# Patient Record
Sex: FEMALE | Race: WHITE | Employment: OTHER | URBAN - METROPOLITAN AREA
[De-identification: names, ages, dates, MRNs, and addresses within clinical notes are randomized per-mention and may not be internally consistent; named-entity substitution may affect disease eponyms.]

---

## 2017-04-20 ENCOUNTER — HOSPITAL ENCOUNTER (OUTPATIENT)
Dept: GENERAL RADIOLOGY | Age: 67
Discharge: HOME | End: 2017-04-20
Payer: MEDICARE

## 2017-04-20 ENCOUNTER — HOSPITAL ENCOUNTER (OUTPATIENT)
Dept: MRI IMAGING | Age: 67
Discharge: HOME | End: 2017-04-20
Payer: MEDICARE

## 2017-04-20 DIAGNOSIS — M54.12 CERVICAL RADICULOPATHY: ICD-10-CM

## 2017-04-20 DIAGNOSIS — M54.12 RADICULOPATHY OF CERVICAL SPINE: ICD-10-CM

## 2017-04-20 PROCEDURE — 72141 MRI NECK SPINE W/O DYE: CPT

## 2017-04-20 PROCEDURE — 72050 X-RAY EXAM NECK SPINE 4/5VWS: CPT

## 2017-11-01 ENCOUNTER — HOSPITAL ENCOUNTER (OUTPATIENT)
Dept: MAMMOGRAPHY | Age: 67
Discharge: HOME | End: 2017-11-01
Attending: FAMILY MEDICINE
Payer: MEDICARE

## 2017-11-01 DIAGNOSIS — Z12.31 VISIT FOR SCREENING MAMMOGRAM: ICD-10-CM

## 2017-11-01 PROCEDURE — 77067 SCR MAMMO BI INCL CAD: CPT

## 2018-05-14 ENCOUNTER — HOSPITAL ENCOUNTER (OUTPATIENT)
Dept: CT IMAGING | Age: 68
Discharge: HOME | End: 2018-05-14
Attending: SURGERY
Payer: MEDICARE

## 2018-05-14 DIAGNOSIS — R22.1 NECK MASS: ICD-10-CM

## 2018-05-14 PROCEDURE — 70490 CT SOFT TISSUE NECK W/O DYE: CPT

## 2018-11-07 ENCOUNTER — HOSPITAL ENCOUNTER (OUTPATIENT)
Dept: MAMMOGRAPHY | Age: 68
Discharge: HOME | End: 2018-11-07
Payer: MEDICARE

## 2018-11-07 DIAGNOSIS — Z12.39 SCREENING BREAST EXAMINATION: ICD-10-CM

## 2018-11-07 PROCEDURE — 77067 SCR MAMMO BI INCL CAD: CPT

## 2019-09-20 ENCOUNTER — HOSPITAL ENCOUNTER (OUTPATIENT)
Dept: INTERVENTIONAL RADIOLOGY/VASCULAR | Age: 69
Discharge: HOME | End: 2019-09-20
Attending: ORTHOPAEDIC SURGERY | Admitting: RADIOLOGY
Payer: MEDICARE

## 2019-09-20 DIAGNOSIS — M54.50 LOW BACK PAIN: ICD-10-CM

## 2019-09-20 DIAGNOSIS — M51.36 DDD (DEGENERATIVE DISC DISEASE), LUMBAR: ICD-10-CM

## 2019-09-20 PROCEDURE — 74011250636 HC RX REV CODE- 250/636: Performed by: RADIOLOGY

## 2019-09-20 PROCEDURE — 74011636320 HC RX REV CODE- 636/320: Performed by: RADIOLOGY

## 2019-09-20 PROCEDURE — 74011000250 HC RX REV CODE- 250: Performed by: RADIOLOGY

## 2019-09-20 PROCEDURE — 64483 NJX AA&/STRD TFRM EPI L/S 1: CPT

## 2019-09-20 RX ORDER — LIDOCAINE HYDROCHLORIDE 10 MG/ML
10 INJECTION, SOLUTION EPIDURAL; INFILTRATION; INTRACAUDAL; PERINEURAL ONCE
Status: COMPLETED | OUTPATIENT
Start: 2019-09-20 | End: 2019-09-20

## 2019-09-20 RX ORDER — DEXAMETHASONE SODIUM PHOSPHATE 10 MG/ML
10 INJECTION INTRAMUSCULAR; INTRAVENOUS ONCE
Status: COMPLETED | OUTPATIENT
Start: 2019-09-20 | End: 2019-09-20

## 2019-09-20 RX ADMIN — DEXAMETHASONE SODIUM PHOSPHATE 10 MG: 10 INJECTION INTRAMUSCULAR; INTRAVENOUS at 13:56

## 2019-09-20 RX ADMIN — IOHEXOL 2 ML: 180 INJECTION INTRAVENOUS at 14:13

## 2019-09-20 RX ADMIN — LIDOCAINE HYDROCHLORIDE 10 ML: 10 INJECTION, SOLUTION EPIDURAL; INFILTRATION; INTRACAUDAL; PERINEURAL at 14:12

## 2019-11-12 ENCOUNTER — HOSPITAL ENCOUNTER (OUTPATIENT)
Dept: MAMMOGRAPHY | Age: 69
Discharge: HOME | End: 2019-11-12
Attending: FAMILY MEDICINE
Payer: MEDICARE

## 2019-11-12 DIAGNOSIS — Z12.31 VISIT FOR SCREENING MAMMOGRAM: ICD-10-CM

## 2019-11-12 PROCEDURE — 77067 SCR MAMMO BI INCL CAD: CPT

## 2020-10-22 ENCOUNTER — TRANSCRIBE ORDER (OUTPATIENT)
Dept: SCHEDULING | Age: 70
End: 2020-10-22

## 2020-10-22 DIAGNOSIS — Z12.31 VISIT FOR SCREENING MAMMOGRAM: Primary | ICD-10-CM

## 2020-10-28 ENCOUNTER — HOSPITAL ENCOUNTER (OUTPATIENT)
Dept: PREADMISSION TESTING | Age: 70
Discharge: HOME | End: 2020-10-28
Payer: MEDICARE

## 2020-10-28 VITALS
WEIGHT: 192.24 LBS | HEART RATE: 75 BPM | SYSTOLIC BLOOD PRESSURE: 130 MMHG | TEMPERATURE: 97.9 F | HEIGHT: 65 IN | DIASTOLIC BLOOD PRESSURE: 75 MMHG | BODY MASS INDEX: 32.03 KG/M2 | RESPIRATION RATE: 18 BRPM

## 2020-10-28 LAB
ABO + RH BLD: NORMAL
APPEARANCE UR: CLEAR
BACTERIA URNS QL MICRO: NEGATIVE /HPF
BILIRUB UR QL: NEGATIVE
BLOOD GROUP ANTIBODIES SERPL: NORMAL
COLOR UR: NORMAL
EPITH CASTS URNS QL MICRO: NORMAL /LPF
GLUCOSE UR STRIP.AUTO-MCNC: NEGATIVE MG/DL
HGB UR QL STRIP: NEGATIVE
KETONES UR QL STRIP.AUTO: NEGATIVE MG/DL
LEUKOCYTE ESTERASE UR QL STRIP.AUTO: NEGATIVE
NITRITE UR QL STRIP.AUTO: NEGATIVE
PH UR STRIP: 6.5 [PH] (ref 5–8)
PROT UR STRIP-MCNC: NEGATIVE MG/DL
RBC #/AREA URNS HPF: NORMAL /HPF (ref 0–5)
SP GR UR REFRACTOMETRY: 1.01 (ref 1–1.03)
SPECIMEN EXP DATE BLD: NORMAL
UA: UC IF INDICATED,UAUC: NORMAL
UROBILINOGEN UR QL STRIP.AUTO: 0.2 EU/DL (ref 0.2–1)
WBC URNS QL MICRO: NORMAL /HPF (ref 0–4)

## 2020-10-28 PROCEDURE — 86900 BLOOD TYPING SEROLOGIC ABO: CPT

## 2020-10-28 PROCEDURE — 81001 URINALYSIS AUTO W/SCOPE: CPT

## 2020-10-28 RX ORDER — ALENDRONATE SODIUM 70 MG/1
70 TABLET ORAL
COMMUNITY

## 2020-10-28 RX ORDER — ERGOCALCIFEROL 1.25 MG/1
50000 CAPSULE ORAL
COMMUNITY

## 2020-10-28 RX ORDER — OMEPRAZOLE 40 MG/1
40 CAPSULE, DELAYED RELEASE ORAL DAILY
COMMUNITY

## 2020-10-28 RX ORDER — ATORVASTATIN CALCIUM 10 MG/1
10 TABLET, FILM COATED ORAL DAILY
COMMUNITY

## 2020-10-28 RX ORDER — VALSARTAN 80 MG/1
80 TABLET ORAL DAILY
COMMUNITY

## 2020-10-28 NOTE — PERIOP NOTES
PAT Nurse Practitioner   Pre-Operative Chart Review/Assessment:-ORTHOPEDIC/NEUROSURGICAL SPINE                Patient Name:  Sabina Demarco                                                           Age:   65 y.o.    :  1950     Today's Date:  2020     Date of PAT:   10/28/2020      Date of Surgery:    11/10/2020      Procedure(s):  L4-S1 Laminectomy REVISION and Fusion     Surgeon:   Dr. Kel Souza                       PLAN:       1)  PCP: Dr. Sonja Estrada        2)  Cardiac Clearance: Followed by Dr. Angelena Snellen. LOV 9/3/20. Stable, no changes. 3)  Diabetic Treatment Consult: Not indicated. A1c-5.6. 4)  Sleep Apnea evaluation:  + EDGAR dx. Uses CPAP.        5)  Treatment for MRSA/Staph Aureus: Neg       6)  Additional Concerns: HTN, GERD              Vital Signs:         Vitals:    10/28/20 1133   BP: 130/75   Pulse: 75   Resp: 18   Temp: 97.9 °F (36.6 °C)   Weight: 87.2 kg (192 lb 3.9 oz)   Height: 5' 5\" (1.651 m)            ____________________________________________  PAST MEDICAL HISTORY  Past Medical History:   Diagnosis Date    Arrhythmia     HX MITRAL VALVE PROLAPSE, HAS BEEN SINCE DIAGNOSED NEGATIVE     Arthritis     Autoimmune disease ([address]     HLA B27    Chronic pain     LEGS, AND WHOLE BODY     GERD (gastroesophageal reflux disease)     High cholesterol     Hypertension     Sleep apnea       ____________________________________________  PAST SURGICAL HISTORY  Past Surgical History:   Procedure Laterality Date    HX CATARACT REMOVAL Bilateral 2016    HX  SECTION  1982    X1    HX HEART CATHETERIZATION      HX HEENT  2010    ORAL SURGERY     HX HERNIA REPAIR      HX HYSTERECTOMY      HX LUMBAR FUSION      HX TONSILLECTOMY      HX WISDOM TEETH EXTRACTION      IR INJ FORAMIN EPID LUMB ANES/STER SNGL  2019      ____________________________________________  HOME MEDICATIONS    Current Outpatient Medications   Medication Sig    omeprazole (PRILOSEC) 40 mg capsule Take 40 mg by mouth daily.  valsartan (DIOVAN) 80 mg tablet Take 80 mg by mouth daily.  atorvastatin (LIPITOR) 10 mg tablet Take 10 mg by mouth daily.  ergocalciferol (Vitamin D2) 1,250 mcg (50,000 unit) capsule Take 50,000 Units by mouth every seven (7) days.  ASA/mag hydrox/aluminum hydrox (ARTHRITIS PAIN FORMULA PO) Take 650 mg by mouth.  alendronate (FOSAMAX) 70 mg tablet Take 70 mg by mouth every seven (7) days.  flecainide (TAMBOCOR) 150 mg tablet Take 0.5 Tabs by mouth every twelve (12) hours. No current facility-administered medications for this encounter.       ____________________________________________  ALLERGIES  Allergies   Allergen Reactions    Penicillins Anaphylaxis    Codeine Itching     ANXIOUS AND NERVOUS      Sulfa (Sulfonamide Antibiotics) Hives      ____________________________________________  SOCIAL HISTORY  Social History     Tobacco Use    Smoking status: Former Smoker     Last attempt to quit: 10/28/1967     Years since quittin.0    Smokeless tobacco: Never Used    Tobacco comment: 1 PACK A WEEK    Substance Use Topics    Alcohol use: No      ____________________________________________        Labs:     CBC, BMP, PT, Hgb A1c, U/A and EKG on chart.     Hospital Outpatient Visit on 10/28/2020   Component Date Value Ref Range Status    Crossmatch Expiration 10/28/2020 2020,2359   Final    ABO/Rh(D) 10/28/2020 B POSITIVE   Final    Antibody screen 10/28/2020 NEG   Final    Color 10/28/2020 YELLOW/STRAW    Final    Color Reference Range: Straw, Yellow or Dark Yellow    Appearance 10/28/2020 CLEAR  CLEAR   Final    Specific gravity 10/28/2020 1.006  1.003 - 1.030   Final    pH (UA) 10/28/2020 6.5  5.0 - 8.0   Final    Protein 10/28/2020 Negative  NEG mg/dL Final    Glucose 10/28/2020 Negative  NEG mg/dL Final    Ketone 10/28/2020 Negative  NEG mg/dL Final    Bilirubin 10/28/2020 Negative  NEG   Final    Blood 10/28/2020 Negative  NEG   Final    Urobilinogen 10/28/2020 0.2  0.2 - 1.0 EU/dL Final    Nitrites 10/28/2020 Negative  NEG   Final    Leukocyte Esterase 10/28/2020 Negative  NEG   Final    WBC 10/28/2020 0-4  0 - 4 /hpf Final    RBC 10/28/2020 0-5  0 - 5 /hpf Final    Epithelial cells 10/28/2020 FEW  FEW /lpf Final    Epithelial cell category consists of squamous cells and /or transitional urothelial cells. Renal tubular cells, if present, are separately identified as such.  Bacteria 10/28/2020 Negative  NEG /hpf Final    UA:UC IF INDICATED 10/28/2020 CULTURE NOT INDICATED BY UA RESULT  CNI   Final    Special Requests: 10/28/2020 NO SPECIAL REQUESTS    Final    Culture result: 10/28/2020 MRSA NOT PRESENT    Final       Skin:     Denies open wounds, cuts, sores, rashes or other areas of concern in PAT assessment.         Aaron Cardenas, NP

## 2020-10-28 NOTE — PERIOP NOTES
PRE-OPERATIVE INSTRUCTIONS REVIEWED WITH PATIENT. TIME GIVEN TO ASK QUESTIONS   PATIENT GIVEN 2-BOTTLE OF CHG SOAP. REVIEWED   PATIENT GIVEN SSI INFECTION FAQ SHEET.   MRSA / MSSA TREATMENT INSTRUCTION SHEET      INSTRUCTIONS GIVEN AND REVIEWED ON NEW ADMISSION STATUS AND COVID TESTING     CALLED CARDIO FOR NOTES AND PCP

## 2020-10-29 LAB
BACTERIA SPEC CULT: NORMAL
BACTERIA SPEC CULT: NORMAL
SERVICE CMNT-IMP: NORMAL

## 2020-11-03 NOTE — H&P
[facility]  Location: [place] [address] [address]  Patient #: [id number]  : 1950   / Language: English / Race: White  Female    History of Present Illness   The patient is a 65year old female who presents with a complaint of Low Back Pain. Note for \"Low Back Pain\": She comes in today for repeat follow-up for ongoing lower back and bilateral leg symptoms. Jan Gannon has been getting epidurals last year without significant improvement.  She has back pain with standing.  It is worse with ambulation.  It is better with sitting.  The epidurals only provided very temporary relief last year. Jan Gannon denies any bowel bladder difficulties. Problem List/Past Medical   SPONDYLOSIS (721.3)    Back pain (724.5  M54.9)    Primary osteoarthritis of both knees (715.16  M17.0)    DDD (722.4)    DDD (degenerative disc disease), cervical (722.4  M50.30)    Lumbar adjacent segment disease with spondylolisthesis (722.52, 738.4  M51.36, M43.16)    Peripheral neuropathy (356.9  G62. 9)    SPONDYLOLISTHESIS, ACQ. (738.4)    CERVICALGIA (723.1)    Hypertension, goal below 140/90 (401.9  I10)    REVIEW OF SYSTEMS: Systems were reviewed by the provider.    Low back pain (724.2  M54.5)    Cervicalgia (723.1  M54.2)    Degenerative lumbar spinal stenosis (724.02  M48.061)    Cervical kyphosis (737.10  M40.202)    History of lumbar fusion (V45.4  Z98.1)    Lumbar radiculopathy, right (724.4  M54.16)      Allergies   Penicillins   [2020]: Hives. Sulfa Drugs   [2020]: Hives. No Known Drug Allergies   [2020]:    Family History  Arthritis    Alcohol Abuse    Colon Cancer    Migraine Headache      Social History   Caffeine use   2014: Drinks coffee and tea 3-4 times a day. Tobacco use   Current every day smoker: 0.25 packs per day, started smoking in  at age 19 (11.5 pack years), smokes 0 cigar(s) per week, uses 0 can(s) of smokeless tobacco per week.   Seat Belt Use   2014: always  Tiajuana Shone Exposure   2014: frequently    Medication History   predniSONE  (5MG Tablet, Oral) Active. Losartan Potassium  (100MG Tablet, Oral) Active. Omeprazole  (Oral as directed) Specific strength unknown - Active. Famotidine  (Oral as directed) Specific strength unknown - Active. Flecainide Acetate  (50MG Tablet, Oral four times daily, as needed) Active. Alendronate Sodium  (70MG Tablet, Oral once a week) Active. Medications Reconciled     Past Surgical History  Tonsillectomy    Tubal Ligation    Hysterectomy   non-cancerous: partial and vaginal   Delivery   1 time  Spinal Surgery    Spinal Fusion   lower back  Abdominal Hernia Surgery    Inguinal Hernia Repair   open: right  Joint Fusion      Diagnostic Studies History  Lumbar Spine X-ray   Date: 2020, Results: X-rays today demonstrate a prior L4-5 fusion. She has a approximately 13 mm grade 1 spondylolisthesis at L5-S1. It reduces slightly with extension. Other Problems   Anemia    Osteoporosis    Gastroesophageal Reflux Disease    Rheumatoid Arthritis    Transfusion history    Severe allergy    Arthritis    Treatment options were discussed with the patient in full.    Numbness and tingling lower extremities (782.0)      Vitals  2020 4:13 PM  Weight: 190 lb   Height: 65 in   Body Surface Area: 1.94 m²   Body Mass Index: 31.62 kg/m²      Physical Exam   Neurologic  Sensory  Light Touch - Intact - Globally. Overall Assessment of Muscle Strength and Tone reveals  Lower Extremities - Right Iliopsoas - 5/5. Left Iliopsoas - 5/5. Right Tibialis Anterior - 5/5. Left Tibialis Anterior - 5/5. Right Gastroc-Soleus - 5/5. Left Gastroc-Soleus - 5/5. Right EHL - 4-/5. Left EHL - 5/5. General Assessment of Reflexes  Right Ankle - Clonus is not present. Left Ankle - Clonus is not present. Reflexes (Dermatomes)  2/2 Normal - Left Achilles (L5-S2), Left Knee (L2-4), Right Achilles (L5-S2) and Right Knee (L2-4).     Musculoskeletal  Global Assessment  Examination of related systems reveals - well-developed, well-nourished, in no acute distress, alert and oriented x 3. Gait and Station - normal gait and station and normal posture. Right Lower Extremity - normal strength and tone, normal range of motion without pain and no instability, subluxation or laxity. Left Lower Extremity - normal strength and tone, normal range of motion without pain and no instability, subluxation or laxity. Spine/Ribs/Pelvis  Cervical Spine - Examination of the cervical spine reveals - no tenderness to palpation, no pain, no swelling, edema or erythema, normal cervical spine movements and normal sensation. Thoracic (Dorsal) Spine - Examination of the thoracic spine reveals - no tenderness over thoracic vertebrae, no pain, normal sensation and normal thoracic spine movements. Lumbosacral Spine - Examination of the lumbosacral spine reveals - no known fractures or deformities. Inspection and Palpation - Tenderness - moderate. Assessment of pain reveals the following findings - The pain is characterized as - moderate. Location - pain refers to lower back bilaterally. ROJM - Trunk Extension - 15 degrees. Lumbar Spine Flexion - 35 °. Lumbosacral Spine - Functional Testing - Babinski Test negative, Prone Knee Bending Test negative, Slump Test negative, Straight Leg Raising Test negative. Assessment & Plan   REVIEW OF SYSTEMS: Systems were reviewed by the provider.(V49.9)    Current Plans  Pt Education - How to [facility] using Patient Portal and 3rd Party Apps: discussed with patient and provided information. X-RAY EXAM OF LUMBAR SPINE, 4 OR MORE VIEWS (69764) (Standing AP, Lateral, Flexion and Extension views were taken today using Digital Radiography.)  Restarted traMADol HCl 50 MG Oral Tablet, 1 (one) Tablet every 6-8 hours as needed for pain, #30, 09/10/2020, No Refill.     History of lumbar fusion (V45.4  Z98.1)    Spondylolisthesis (Acquired) (738.4 M43.10)  Impression: The patient has a chronic street of lower back pain and bilateral leg pain. She has a breakdown below her previous L4-5 fusion with unstable spondylosis. She is an excellent candidate for revision laminectomy with extension of her fusion to S1. Risk and benefits were discussed with her. The risks and benefits were discussed at length with the patient and the patient has elected to proceed. Indications for surgery include failed conservative treatment. Alternative treatments, risks and the perioperative course were discussed with the patient. All questions were answered. The risks and benefits of the procedure were explained. Benefits include definitive diagnosis, relief of pain, elimination of deformity and improved function. Risks of surgery including bleeding, infection, weakness, numbness, CSF leak, failure to improve symptoms, exacerbation of medical co-morbidities and even death were discussed with the patient. Lumbar stenosis with neurogenic claudication (724.03  M48.062)    Treatment options were discussed with the patient in full.(V65.49)    Current Plans  Presurgical planning was preformed with the patient today  Surgery to be scheduled  Procedure: L4-S1 revision laminectomy and L4-S1 fusion    Signed by Estelita Maravilla MD     Date of Surgery Update:  Wili Moctezuma was seen and examined. History and physical has been reviewed. The patient has been examined.  There have been no significant clinical changes since the completion of the originally dated History and Physical.    Signed By: Estelita Maravilla MD     November 10, 2020 11:41 AM

## 2020-11-06 ENCOUNTER — TRANSCRIBE ORDER (OUTPATIENT)
Dept: REGISTRATION | Age: 70
End: 2020-11-06

## 2020-11-06 ENCOUNTER — HOSPITAL ENCOUNTER (OUTPATIENT)
Dept: PREADMISSION TESTING | Age: 70
Discharge: HOME | End: 2020-11-06
Payer: MEDICARE

## 2020-11-06 DIAGNOSIS — Z01.812 PRE-PROCEDURE LAB EXAM: Primary | ICD-10-CM

## 2020-11-06 DIAGNOSIS — Z01.812 PRE-PROCEDURE LAB EXAM: ICD-10-CM

## 2020-11-06 PROCEDURE — 87635 SARS-COV-2 COVID-19 AMP PRB: CPT

## 2020-11-07 LAB — SARS-COV-2, COV2NT: NOT DETECTED

## 2020-11-10 ENCOUNTER — HOSPITAL ENCOUNTER (INPATIENT)
Age: 70
LOS: 4 days | Discharge: HOME HEALTH | DRG: 455 | End: 2020-11-14
Attending: ORTHOPAEDIC SURGERY | Admitting: ORTHOPAEDIC SURGERY
Payer: MEDICARE

## 2020-11-10 ENCOUNTER — APPOINTMENT (OUTPATIENT)
Dept: GENERAL RADIOLOGY | Age: 70
DRG: 455 | End: 2020-11-10
Attending: ORTHOPAEDIC SURGERY
Payer: MEDICARE

## 2020-11-10 ENCOUNTER — ANESTHESIA (OUTPATIENT)
Dept: SURGERY | Age: 70
DRG: 455 | End: 2020-11-10
Payer: MEDICARE

## 2020-11-10 ENCOUNTER — ANESTHESIA EVENT (OUTPATIENT)
Dept: SURGERY | Age: 70
DRG: 455 | End: 2020-11-10
Payer: MEDICARE

## 2020-11-10 DIAGNOSIS — M48.061 SPINAL STENOSIS OF LUMBAR REGION WITHOUT NEUROGENIC CLAUDICATION: Primary | ICD-10-CM

## 2020-11-10 LAB
GLUCOSE BLD STRIP.AUTO-MCNC: 90 MG/DL (ref 65–100)
SERVICE CMNT-IMP: NORMAL

## 2020-11-10 PROCEDURE — 74011000250 HC RX REV CODE- 250: Performed by: ORTHOPAEDIC SURGERY

## 2020-11-10 PROCEDURE — 82962 GLUCOSE BLOOD TEST: CPT

## 2020-11-10 PROCEDURE — 01NB0ZZ RELEASE LUMBAR NERVE, OPEN APPROACH: ICD-10-PCS | Performed by: ORTHOPAEDIC SURGERY

## 2020-11-10 PROCEDURE — 77030004391 HC BUR FLUT MEDT -C: Performed by: ORTHOPAEDIC SURGERY

## 2020-11-10 PROCEDURE — 74011000258 HC RX REV CODE- 258: Performed by: NURSE ANESTHETIST, CERTIFIED REGISTERED

## 2020-11-10 PROCEDURE — 74011000250 HC RX REV CODE- 250: Performed by: NURSE ANESTHETIST, CERTIFIED REGISTERED

## 2020-11-10 PROCEDURE — 77030037714 HC CLOSR DEV INCIS ZIP STRY -C: Performed by: ORTHOPAEDIC SURGERY

## 2020-11-10 PROCEDURE — 74011250636 HC RX REV CODE- 250/636: Performed by: ANESTHESIOLOGY

## 2020-11-10 PROCEDURE — 77030019908 HC STETH ESOPH SIMS -A: Performed by: ANESTHESIOLOGY

## 2020-11-10 PROCEDURE — 76060000037 HC ANESTHESIA 3 TO 3.5 HR: Performed by: ORTHOPAEDIC SURGERY

## 2020-11-10 PROCEDURE — 77030040361 HC SLV COMPR DVT MDII -B: Performed by: ORTHOPAEDIC SURGERY

## 2020-11-10 PROCEDURE — 77030005513 HC CATH URETH FOL11 MDII -B: Performed by: ORTHOPAEDIC SURGERY

## 2020-11-10 PROCEDURE — 74011000250 HC RX REV CODE- 250: Performed by: PHYSICIAN ASSISTANT

## 2020-11-10 PROCEDURE — 0SG1071 FUSION OF 2 OR MORE LUMBAR VERTEBRAL JOINTS WITH AUTOLOGOUS TISSUE SUBSTITUTE, POSTERIOR APPROACH, POSTERIOR COLUMN, OPEN APPROACH: ICD-10-PCS | Performed by: ORTHOPAEDIC SURGERY

## 2020-11-10 PROCEDURE — 77030031139 HC SUT VCRL2 J&J -A: Performed by: ORTHOPAEDIC SURGERY

## 2020-11-10 PROCEDURE — 74011250637 HC RX REV CODE- 250/637: Performed by: PHYSICIAN ASSISTANT

## 2020-11-10 PROCEDURE — 77030041394 HC GRFT BN PROT GRWTH FCTR BSYC -I1: Performed by: ORTHOPAEDIC SURGERY

## 2020-11-10 PROCEDURE — 74011250636 HC RX REV CODE- 250/636: Performed by: NURSE ANESTHETIST, CERTIFIED REGISTERED

## 2020-11-10 PROCEDURE — 77030026438 HC STYL ET INTUB CARD -A: Performed by: ANESTHESIOLOGY

## 2020-11-10 PROCEDURE — 77030038600 HC TU BPLR IRR DISP STRY -B: Performed by: ORTHOPAEDIC SURGERY

## 2020-11-10 PROCEDURE — C1713 ANCHOR/SCREW BN/BN,TIS/BN: HCPCS | Performed by: ORTHOPAEDIC SURGERY

## 2020-11-10 PROCEDURE — 2709999900 HC NON-CHARGEABLE SUPPLY: Performed by: ORTHOPAEDIC SURGERY

## 2020-11-10 PROCEDURE — 74011250636 HC RX REV CODE- 250/636

## 2020-11-10 PROCEDURE — 74011250636 HC RX REV CODE- 250/636: Performed by: PHYSICIAN ASSISTANT

## 2020-11-10 PROCEDURE — 76010000172 HC OR TIME 2.5 TO 3 HR INTENSV-TIER 1: Performed by: ORTHOPAEDIC SURGERY

## 2020-11-10 PROCEDURE — 77030038552 HC DRN WND MDII -A: Performed by: ORTHOPAEDIC SURGERY

## 2020-11-10 PROCEDURE — 77030008684 HC TU ET CUF COVD -B: Performed by: ANESTHESIOLOGY

## 2020-11-10 PROCEDURE — 0SG30AJ FUSION OF LUMBOSACRAL JOINT WITH INTERBODY FUSION DEVICE, POSTERIOR APPROACH, ANTERIOR COLUMN, OPEN APPROACH: ICD-10-PCS | Performed by: ORTHOPAEDIC SURGERY

## 2020-11-10 PROCEDURE — 74011250636 HC RX REV CODE- 250/636: Performed by: ORTHOPAEDIC SURGERY

## 2020-11-10 PROCEDURE — 77030029099 HC BN WAX SSPC -A: Performed by: ORTHOPAEDIC SURGERY

## 2020-11-10 PROCEDURE — 77030013079 HC BLNKT BAIR HGGR 3M -A: Performed by: ANESTHESIOLOGY

## 2020-11-10 PROCEDURE — 0ST40ZZ RESECTION OF LUMBOSACRAL DISC, OPEN APPROACH: ICD-10-PCS | Performed by: ORTHOPAEDIC SURGERY

## 2020-11-10 PROCEDURE — C1889 IMPLANT/INSERT DEVICE, NOC: HCPCS | Performed by: ORTHOPAEDIC SURGERY

## 2020-11-10 PROCEDURE — 65270000032 HC RM SEMIPRIVATE

## 2020-11-10 PROCEDURE — 72100 X-RAY EXAM L-S SPINE 2/3 VWS: CPT

## 2020-11-10 PROCEDURE — 76210000016 HC OR PH I REC 1 TO 1.5 HR: Performed by: ORTHOPAEDIC SURGERY

## 2020-11-10 PROCEDURE — 74011250637 HC RX REV CODE- 250/637: Performed by: ANESTHESIOLOGY

## 2020-11-10 PROCEDURE — 2709999900 HC NON-CHARGEABLE SUPPLY

## 2020-11-10 PROCEDURE — 77030014650 HC SEAL MTRX FLOSEL BAXT -C: Performed by: ORTHOPAEDIC SURGERY

## 2020-11-10 PROCEDURE — 01NR0ZZ RELEASE SACRAL NERVE, OPEN APPROACH: ICD-10-PCS | Performed by: ORTHOPAEDIC SURGERY

## 2020-11-10 PROCEDURE — 0QP004Z REMOVAL OF INTERNAL FIXATION DEVICE FROM LUMBAR VERTEBRA, OPEN APPROACH: ICD-10-PCS | Performed by: ORTHOPAEDIC SURGERY

## 2020-11-10 PROCEDURE — 77030018836 HC SOL IRR NACL ICUM -A: Performed by: ORTHOPAEDIC SURGERY

## 2020-11-10 PROCEDURE — 77030037808 HC GRFT SPNG OSTEOAMP BTUS -H1: Performed by: ORTHOPAEDIC SURGERY

## 2020-11-10 DEVICE — SCREW 55840007540 5.5/6 MAS 7.5X40 CC
Type: IMPLANTABLE DEVICE | Site: SPINE LUMBAR | Status: FUNCTIONAL
Brand: CD HORIZON® SPINAL SYSTEM

## 2020-11-10 DEVICE — GRAFT BNE SUB M W20XH7XL30MM COMPRESSIBLE SPNG STRP PROVIDE: Type: IMPLANTABLE DEVICE | Site: SPINE LUMBAR | Status: FUNCTIONAL

## 2020-11-10 DEVICE — STRIP 7800310 MASTERGRAFT STRIP 10CM
Type: IMPLANTABLE DEVICE | Site: SPINE LUMBAR | Status: FUNCTIONAL
Brand: MASTERGRAFT® STRIP

## 2020-11-10 DEVICE — SPACER 7770723 ELEVATE X-LOR 23X7MM
Type: IMPLANTABLE DEVICE | Site: SPINE LUMBAR | Status: FUNCTIONAL
Brand: ELEVATE™ SPINAL SYSTEM

## 2020-11-10 DEVICE — GRAFT BNE XL: Type: IMPLANTABLE DEVICE | Site: SPINE LUMBAR | Status: FUNCTIONAL

## 2020-11-10 RX ORDER — FENTANYL CITRATE 50 UG/ML
25 INJECTION, SOLUTION INTRAMUSCULAR; INTRAVENOUS
Status: DISCONTINUED | OUTPATIENT
Start: 2020-11-10 | End: 2020-11-10 | Stop reason: HOSPADM

## 2020-11-10 RX ORDER — ONDANSETRON 2 MG/ML
4 INJECTION INTRAMUSCULAR; INTRAVENOUS
Status: ACTIVE | OUTPATIENT
Start: 2020-11-10 | End: 2020-11-11

## 2020-11-10 RX ORDER — SUCCINYLCHOLINE CHLORIDE 20 MG/ML
INJECTION INTRAMUSCULAR; INTRAVENOUS AS NEEDED
Status: DISCONTINUED | OUTPATIENT
Start: 2020-11-10 | End: 2020-11-10 | Stop reason: HOSPADM

## 2020-11-10 RX ORDER — VANCOMYCIN/0.9 % SOD CHLORIDE 1.5G/250ML
1500 PLASTIC BAG, INJECTION (ML) INTRAVENOUS ONCE
Status: COMPLETED | OUTPATIENT
Start: 2020-11-11 | End: 2020-11-11

## 2020-11-10 RX ORDER — SODIUM CHLORIDE 9 MG/ML
25 INJECTION, SOLUTION INTRAVENOUS CONTINUOUS
Status: DISCONTINUED | OUTPATIENT
Start: 2020-11-10 | End: 2020-11-10 | Stop reason: HOSPADM

## 2020-11-10 RX ORDER — SODIUM CHLORIDE 0.9 % (FLUSH) 0.9 %
5-40 SYRINGE (ML) INJECTION AS NEEDED
Status: DISCONTINUED | OUTPATIENT
Start: 2020-11-10 | End: 2020-11-10 | Stop reason: HOSPADM

## 2020-11-10 RX ORDER — SODIUM CHLORIDE, SODIUM LACTATE, POTASSIUM CHLORIDE, CALCIUM CHLORIDE 600; 310; 30; 20 MG/100ML; MG/100ML; MG/100ML; MG/100ML
75 INJECTION, SOLUTION INTRAVENOUS CONTINUOUS
Status: DISCONTINUED | OUTPATIENT
Start: 2020-11-10 | End: 2020-11-10 | Stop reason: HOSPADM

## 2020-11-10 RX ORDER — PROPOFOL 10 MG/ML
INJECTION, EMULSION INTRAVENOUS
Status: DISCONTINUED | OUTPATIENT
Start: 2020-11-10 | End: 2020-11-10 | Stop reason: HOSPADM

## 2020-11-10 RX ORDER — SODIUM CHLORIDE 0.9 % (FLUSH) 0.9 %
5-40 SYRINGE (ML) INJECTION EVERY 8 HOURS
Status: DISCONTINUED | OUTPATIENT
Start: 2020-11-10 | End: 2020-11-10 | Stop reason: HOSPADM

## 2020-11-10 RX ORDER — HYDROMORPHONE HYDROCHLORIDE 1 MG/ML
0.2 INJECTION, SOLUTION INTRAMUSCULAR; INTRAVENOUS; SUBCUTANEOUS
Status: DISCONTINUED | OUTPATIENT
Start: 2020-11-10 | End: 2020-11-10 | Stop reason: HOSPADM

## 2020-11-10 RX ORDER — KETAMINE HYDROCHLORIDE 10 MG/ML
INJECTION, SOLUTION INTRAMUSCULAR; INTRAVENOUS AS NEEDED
Status: DISCONTINUED | OUTPATIENT
Start: 2020-11-10 | End: 2020-11-10 | Stop reason: HOSPADM

## 2020-11-10 RX ORDER — ACETAMINOPHEN 325 MG/1
650 TABLET ORAL ONCE
Status: COMPLETED | OUTPATIENT
Start: 2020-11-10 | End: 2020-11-10

## 2020-11-10 RX ORDER — ROPIVACAINE HYDROCHLORIDE 5 MG/ML
30 INJECTION, SOLUTION EPIDURAL; INFILTRATION; PERINEURAL ONCE
Status: DISCONTINUED | OUTPATIENT
Start: 2020-11-10 | End: 2020-11-10 | Stop reason: HOSPADM

## 2020-11-10 RX ORDER — SODIUM CHLORIDE 0.9 % (FLUSH) 0.9 %
5-40 SYRINGE (ML) INJECTION AS NEEDED
Status: DISCONTINUED | OUTPATIENT
Start: 2020-11-10 | End: 2020-11-14 | Stop reason: HOSPADM

## 2020-11-10 RX ORDER — LIDOCAINE HYDROCHLORIDE 10 MG/ML
0.1 INJECTION, SOLUTION EPIDURAL; INFILTRATION; INTRACAUDAL; PERINEURAL AS NEEDED
Status: DISCONTINUED | OUTPATIENT
Start: 2020-11-10 | End: 2020-11-10 | Stop reason: HOSPADM

## 2020-11-10 RX ORDER — DIPHENHYDRAMINE HYDROCHLORIDE 50 MG/ML
12.5 INJECTION, SOLUTION INTRAMUSCULAR; INTRAVENOUS
Status: ACTIVE | OUTPATIENT
Start: 2020-11-10 | End: 2020-11-11

## 2020-11-10 RX ORDER — ONDANSETRON 2 MG/ML
4 INJECTION INTRAMUSCULAR; INTRAVENOUS AS NEEDED
Status: DISCONTINUED | OUTPATIENT
Start: 2020-11-10 | End: 2020-11-10 | Stop reason: HOSPADM

## 2020-11-10 RX ORDER — FENTANYL CITRATE 50 UG/ML
50 INJECTION, SOLUTION INTRAMUSCULAR; INTRAVENOUS AS NEEDED
Status: DISCONTINUED | OUTPATIENT
Start: 2020-11-10 | End: 2020-11-10 | Stop reason: HOSPADM

## 2020-11-10 RX ORDER — VANCOMYCIN HYDROCHLORIDE 1 G/20ML
INJECTION, POWDER, LYOPHILIZED, FOR SOLUTION INTRAVENOUS AS NEEDED
Status: DISCONTINUED | OUTPATIENT
Start: 2020-11-10 | End: 2020-11-10 | Stop reason: HOSPADM

## 2020-11-10 RX ORDER — PROPOFOL 10 MG/ML
INJECTION, EMULSION INTRAVENOUS AS NEEDED
Status: DISCONTINUED | OUTPATIENT
Start: 2020-11-10 | End: 2020-11-10 | Stop reason: HOSPADM

## 2020-11-10 RX ORDER — MIDAZOLAM HYDROCHLORIDE 1 MG/ML
1 INJECTION, SOLUTION INTRAMUSCULAR; INTRAVENOUS AS NEEDED
Status: DISCONTINUED | OUTPATIENT
Start: 2020-11-10 | End: 2020-11-10 | Stop reason: HOSPADM

## 2020-11-10 RX ORDER — MIDAZOLAM HYDROCHLORIDE 1 MG/ML
0.5 INJECTION, SOLUTION INTRAMUSCULAR; INTRAVENOUS
Status: DISCONTINUED | OUTPATIENT
Start: 2020-11-10 | End: 2020-11-10 | Stop reason: HOSPADM

## 2020-11-10 RX ORDER — ROCURONIUM BROMIDE 10 MG/ML
INJECTION, SOLUTION INTRAVENOUS AS NEEDED
Status: DISCONTINUED | OUTPATIENT
Start: 2020-11-10 | End: 2020-11-10 | Stop reason: HOSPADM

## 2020-11-10 RX ORDER — MORPHINE SULFATE IN 0.9 % NACL 150MG/30ML
PATIENT CONTROLLED ANALGESIA SYRINGE INTRAVENOUS
Status: COMPLETED
Start: 2020-11-10 | End: 2020-11-10

## 2020-11-10 RX ORDER — EPHEDRINE SULFATE/0.9% NACL/PF 50 MG/5 ML
SYRINGE (ML) INTRAVENOUS AS NEEDED
Status: DISCONTINUED | OUTPATIENT
Start: 2020-11-10 | End: 2020-11-10 | Stop reason: HOSPADM

## 2020-11-10 RX ORDER — FACIAL-BODY WIPES
10 EACH TOPICAL DAILY PRN
Status: DISCONTINUED | OUTPATIENT
Start: 2020-11-12 | End: 2020-11-14 | Stop reason: HOSPADM

## 2020-11-10 RX ORDER — KETOROLAC TROMETHAMINE 30 MG/ML
15 INJECTION, SOLUTION INTRAMUSCULAR; INTRAVENOUS EVERY 6 HOURS
Status: COMPLETED | OUTPATIENT
Start: 2020-11-10 | End: 2020-11-11

## 2020-11-10 RX ORDER — ACETAMINOPHEN 500 MG
1000 TABLET ORAL EVERY 6 HOURS
Status: DISCONTINUED | OUTPATIENT
Start: 2020-11-10 | End: 2020-11-11

## 2020-11-10 RX ORDER — POLYETHYLENE GLYCOL 3350 17 G/17G
17 POWDER, FOR SOLUTION ORAL DAILY
Status: DISCONTINUED | OUTPATIENT
Start: 2020-11-11 | End: 2020-11-14 | Stop reason: HOSPADM

## 2020-11-10 RX ORDER — MORPHINE SULFATE 10 MG/ML
2 INJECTION, SOLUTION INTRAMUSCULAR; INTRAVENOUS
Status: DISCONTINUED | OUTPATIENT
Start: 2020-11-10 | End: 2020-11-10 | Stop reason: HOSPADM

## 2020-11-10 RX ORDER — DEXAMETHASONE SODIUM PHOSPHATE 4 MG/ML
INJECTION, SOLUTION INTRA-ARTICULAR; INTRALESIONAL; INTRAMUSCULAR; INTRAVENOUS; SOFT TISSUE AS NEEDED
Status: DISCONTINUED | OUTPATIENT
Start: 2020-11-10 | End: 2020-11-10 | Stop reason: HOSPADM

## 2020-11-10 RX ORDER — ONDANSETRON 2 MG/ML
INJECTION INTRAMUSCULAR; INTRAVENOUS AS NEEDED
Status: DISCONTINUED | OUTPATIENT
Start: 2020-11-10 | End: 2020-11-10 | Stop reason: HOSPADM

## 2020-11-10 RX ORDER — VANCOMYCIN/0.9 % SOD CHLORIDE 1.5G/250ML
1500 PLASTIC BAG, INJECTION (ML) INTRAVENOUS ONCE
Status: COMPLETED | OUTPATIENT
Start: 2020-11-10 | End: 2020-11-10

## 2020-11-10 RX ORDER — OXYCODONE HYDROCHLORIDE 5 MG/1
10 TABLET ORAL
Status: DISCONTINUED | OUTPATIENT
Start: 2020-11-10 | End: 2020-11-13

## 2020-11-10 RX ORDER — GLYCOPYRROLATE 0.2 MG/ML
INJECTION INTRAMUSCULAR; INTRAVENOUS AS NEEDED
Status: DISCONTINUED | OUTPATIENT
Start: 2020-11-10 | End: 2020-11-10 | Stop reason: HOSPADM

## 2020-11-10 RX ORDER — MORPHINE SULFATE 2 MG/ML
2 INJECTION, SOLUTION INTRAMUSCULAR; INTRAVENOUS
Status: ACTIVE | OUTPATIENT
Start: 2020-11-10 | End: 2020-11-11

## 2020-11-10 RX ORDER — SODIUM CHLORIDE 9 MG/ML
125 INJECTION, SOLUTION INTRAVENOUS CONTINUOUS
Status: DISCONTINUED | OUTPATIENT
Start: 2020-11-10 | End: 2020-11-11

## 2020-11-10 RX ORDER — MORPHINE SULFATE IN 0.9 % NACL 150MG/30ML
PATIENT CONTROLLED ANALGESIA SYRINGE INTRAVENOUS
Status: DISCONTINUED | OUTPATIENT
Start: 2020-11-10 | End: 2020-11-11

## 2020-11-10 RX ORDER — ATORVASTATIN CALCIUM 10 MG/1
10 TABLET, FILM COATED ORAL DAILY
Status: DISCONTINUED | OUTPATIENT
Start: 2020-11-11 | End: 2020-11-11

## 2020-11-10 RX ORDER — NEOSTIGMINE METHYLSULFATE 1 MG/ML
INJECTION, SOLUTION INTRAVENOUS AS NEEDED
Status: DISCONTINUED | OUTPATIENT
Start: 2020-11-10 | End: 2020-11-10 | Stop reason: HOSPADM

## 2020-11-10 RX ORDER — NALOXONE HYDROCHLORIDE 0.4 MG/ML
0.4 INJECTION, SOLUTION INTRAMUSCULAR; INTRAVENOUS; SUBCUTANEOUS AS NEEDED
Status: DISCONTINUED | OUTPATIENT
Start: 2020-11-10 | End: 2020-11-14 | Stop reason: HOSPADM

## 2020-11-10 RX ORDER — HYDROMORPHONE HYDROCHLORIDE 2 MG/ML
INJECTION, SOLUTION INTRAMUSCULAR; INTRAVENOUS; SUBCUTANEOUS AS NEEDED
Status: DISCONTINUED | OUTPATIENT
Start: 2020-11-10 | End: 2020-11-10 | Stop reason: HOSPADM

## 2020-11-10 RX ORDER — AMOXICILLIN 250 MG
1 CAPSULE ORAL 2 TIMES DAILY
Status: DISCONTINUED | OUTPATIENT
Start: 2020-11-10 | End: 2020-11-14 | Stop reason: HOSPADM

## 2020-11-10 RX ORDER — LOSARTAN POTASSIUM 50 MG/1
50 TABLET ORAL DAILY
Status: DISCONTINUED | OUTPATIENT
Start: 2020-11-11 | End: 2020-11-11

## 2020-11-10 RX ORDER — MIDAZOLAM HYDROCHLORIDE 1 MG/ML
INJECTION, SOLUTION INTRAMUSCULAR; INTRAVENOUS AS NEEDED
Status: DISCONTINUED | OUTPATIENT
Start: 2020-11-10 | End: 2020-11-10 | Stop reason: HOSPADM

## 2020-11-10 RX ORDER — LIDOCAINE HYDROCHLORIDE 20 MG/ML
INJECTION, SOLUTION EPIDURAL; INFILTRATION; INTRACAUDAL; PERINEURAL AS NEEDED
Status: DISCONTINUED | OUTPATIENT
Start: 2020-11-10 | End: 2020-11-10 | Stop reason: HOSPADM

## 2020-11-10 RX ORDER — PANTOPRAZOLE SODIUM 40 MG/1
40 TABLET, DELAYED RELEASE ORAL
Status: DISCONTINUED | OUTPATIENT
Start: 2020-11-11 | End: 2020-11-11

## 2020-11-10 RX ORDER — ERGOCALCIFEROL 1.25 MG/1
50000 CAPSULE ORAL
Status: DISCONTINUED | OUTPATIENT
Start: 2020-11-10 | End: 2020-11-10

## 2020-11-10 RX ORDER — SODIUM CHLORIDE 0.9 % (FLUSH) 0.9 %
5-40 SYRINGE (ML) INJECTION EVERY 8 HOURS
Status: DISCONTINUED | OUTPATIENT
Start: 2020-11-10 | End: 2020-11-14 | Stop reason: HOSPADM

## 2020-11-10 RX ORDER — SODIUM CHLORIDE, SODIUM LACTATE, POTASSIUM CHLORIDE, CALCIUM CHLORIDE 600; 310; 30; 20 MG/100ML; MG/100ML; MG/100ML; MG/100ML
125 INJECTION, SOLUTION INTRAVENOUS CONTINUOUS
Status: DISCONTINUED | OUTPATIENT
Start: 2020-11-10 | End: 2020-11-10 | Stop reason: HOSPADM

## 2020-11-10 RX ORDER — OXYCODONE HYDROCHLORIDE 5 MG/1
5 TABLET ORAL
Status: DISCONTINUED | OUTPATIENT
Start: 2020-11-10 | End: 2020-11-13

## 2020-11-10 RX ORDER — FENTANYL CITRATE 50 UG/ML
INJECTION, SOLUTION INTRAMUSCULAR; INTRAVENOUS AS NEEDED
Status: DISCONTINUED | OUTPATIENT
Start: 2020-11-10 | End: 2020-11-10 | Stop reason: HOSPADM

## 2020-11-10 RX ORDER — DIPHENHYDRAMINE HYDROCHLORIDE 50 MG/ML
12.5 INJECTION, SOLUTION INTRAMUSCULAR; INTRAVENOUS AS NEEDED
Status: DISCONTINUED | OUTPATIENT
Start: 2020-11-10 | End: 2020-11-10 | Stop reason: HOSPADM

## 2020-11-10 RX ADMIN — Medication 10 ML: at 22:00

## 2020-11-10 RX ADMIN — VANCOMYCIN HYDROCHLORIDE 1500 MG: 10 INJECTION, POWDER, LYOPHILIZED, FOR SOLUTION INTRAVENOUS at 12:29

## 2020-11-10 RX ADMIN — SODIUM CHLORIDE 125 ML/HR: 900 INJECTION, SOLUTION INTRAVENOUS at 17:27

## 2020-11-10 RX ADMIN — Medication: at 17:22

## 2020-11-10 RX ADMIN — DEXAMETHASONE SODIUM PHOSPHATE 4 MG: 4 INJECTION, SOLUTION INTRAMUSCULAR; INTRAVENOUS at 16:18

## 2020-11-10 RX ADMIN — SUCCINYLCHOLINE CHLORIDE 100 MG: 20 INJECTION, SOLUTION INTRAMUSCULAR; INTRAVENOUS at 14:20

## 2020-11-10 RX ADMIN — FENTANYL CITRATE 50 MCG: 50 INJECTION, SOLUTION INTRAMUSCULAR; INTRAVENOUS at 14:20

## 2020-11-10 RX ADMIN — PROPOFOL 50 MG: 10 INJECTION, EMULSION INTRAVENOUS at 14:33

## 2020-11-10 RX ADMIN — BENZOCAINE AND MENTHOL 1 LOZENGE: 15; 3.6 LOZENGE ORAL at 21:32

## 2020-11-10 RX ADMIN — HYDROMORPHONE HYDROCHLORIDE 0.5 MG: 2 INJECTION, SOLUTION INTRAMUSCULAR; INTRAVENOUS; SUBCUTANEOUS at 15:56

## 2020-11-10 RX ADMIN — ROCURONIUM BROMIDE 25 MG: 10 SOLUTION INTRAVENOUS at 14:41

## 2020-11-10 RX ADMIN — Medication 5 MG: at 15:38

## 2020-11-10 RX ADMIN — ONDANSETRON HYDROCHLORIDE 4 MG: 2 INJECTION, SOLUTION INTRAMUSCULAR; INTRAVENOUS at 16:29

## 2020-11-10 RX ADMIN — Medication 15 MG: at 14:44

## 2020-11-10 RX ADMIN — FENTANYL CITRATE 25 MCG: 50 INJECTION, SOLUTION INTRAMUSCULAR; INTRAVENOUS at 18:00

## 2020-11-10 RX ADMIN — ROCURONIUM BROMIDE 5 MG: 10 SOLUTION INTRAVENOUS at 14:20

## 2020-11-10 RX ADMIN — PROPOFOL 100 MCG/KG/MIN: 10 INJECTION, EMULSION INTRAVENOUS at 14:37

## 2020-11-10 RX ADMIN — Medication 1 MG: at 16:27

## 2020-11-10 RX ADMIN — PROPOFOL 50 MG: 10 INJECTION, EMULSION INTRAVENOUS at 14:21

## 2020-11-10 RX ADMIN — MIDAZOLAM 2 MG: 1 INJECTION INTRAMUSCULAR; INTRAVENOUS at 14:10

## 2020-11-10 RX ADMIN — ACETAMINOPHEN 1000 MG: 500 TABLET ORAL at 21:34

## 2020-11-10 RX ADMIN — DEXMEDETOMIDINE HYDROCHLORIDE 8 MCG: 100 INJECTION, SOLUTION, CONCENTRATE INTRAVENOUS at 17:12

## 2020-11-10 RX ADMIN — ACETAMINOPHEN 650 MG: 325 TABLET ORAL at 12:27

## 2020-11-10 RX ADMIN — GLYCOPYRROLATE 0.2 MG: 0.2 INJECTION, SOLUTION INTRAMUSCULAR; INTRAVENOUS at 16:27

## 2020-11-10 RX ADMIN — FENTANYL CITRATE 50 MCG: 50 INJECTION, SOLUTION INTRAMUSCULAR; INTRAVENOUS at 14:33

## 2020-11-10 RX ADMIN — FENTANYL CITRATE 25 MCG: 50 INJECTION, SOLUTION INTRAMUSCULAR; INTRAVENOUS at 17:40

## 2020-11-10 RX ADMIN — KETAMINE HYDROCHLORIDE 25 MG: 10 INJECTION, SOLUTION INTRAMUSCULAR; INTRAVENOUS at 14:44

## 2020-11-10 RX ADMIN — KETOROLAC TROMETHAMINE 15 MG: 30 INJECTION, SOLUTION INTRAMUSCULAR at 21:32

## 2020-11-10 RX ADMIN — PROPOFOL 150 MG: 10 INJECTION, EMULSION INTRAVENOUS at 14:20

## 2020-11-10 RX ADMIN — LIDOCAINE HYDROCHLORIDE 100 MG: 20 INJECTION, SOLUTION EPIDURAL; INFILTRATION; INTRACAUDAL; PERINEURAL at 14:20

## 2020-11-10 RX ADMIN — SODIUM CHLORIDE, SODIUM LACTATE, POTASSIUM CHLORIDE, AND CALCIUM CHLORIDE 125 ML/HR: 600; 310; 30; 20 INJECTION, SOLUTION INTRAVENOUS at 12:27

## 2020-11-10 RX ADMIN — Medication 10 MG: at 15:59

## 2020-11-10 RX ADMIN — Medication 10 MG: at 16:14

## 2020-11-10 NOTE — PERIOP NOTES
TRANSFER - OUT REPORT:    Verbal report given to Nathalia (name) on Aurelia Huertas  being transferred to  (unit) for routine post - op       Report consisted of patients Situation, Background, Assessment and   Recommendations(SBAR). Time Pre op antibiotic given:1229  Anesthesia Stop time: 4453  Ball Present on Transfer to floor:yes  Order for Ball on Chart:yes  Discharge Prescriptions with Chart:no    Information from the following report(s) SBAR, OR Summary, Procedure Summary, Intake/Output, MAR and Cardiac Rhythm NSR was reviewed with the receiving nurse. Opportunity for questions and clarification was provided. Is the patient on 02? YES       L/Min 2       Other nc    Is the patient on a monitor? NO    Is the nurse transporting with the patient? NO    Surgical Waiting Area notified of patient's transfer from PACU? YES      The following personal items collected during your admission accompanied patient upon transfer:   Dental Appliance:    Vision: Visual Aid: None  Hearing Aid:    Jewelry:    Clothing: Clothing: (belongings sent to [employer]) bag of belongings with patient in PACU  Other Valuables:    Valuables sent to safe: Anesthesia will sign out patient when available, ok with transfer to floor.

## 2020-11-10 NOTE — ANESTHESIA POSTPROCEDURE EVALUATION
Post-Anesthesia Evaluation and Assessment    Patient: Anival Horan MRN: [id number]  SSN: [id number]    YOB: 1950  Age: 65 y.o. Sex: female      I have evaluated the patient and they are stable and ready for discharge from the PACU. Cardiovascular Function/Vital Signs  Visit Vitals  BP (!) 124/48   Pulse 86   Temp 36.5 °C (97.7 °F)   Resp 12   SpO2 96%       Patient is status post General anesthesia for Procedure(s):  L4-S1 REVISION LAMINECTOMY, L4-S1 FUSION . Nausea/Vomiting: None    Postoperative hydration reviewed and adequate. Pain:  Pain Scale 1: Numeric (0 - 10) (11/10/20 1800)  Pain Intensity 1: 5 (11/10/20 1800)   Managed    Neurological Status:   Neuro (WDL): Exceptions to WDL (11/10/20 1800)  Neuro  Neurologic State: Drowsy (11/10/20 1800)  Orientation Level: Oriented X4 (11/10/20 1800)  Cognition: Follows commands (11/10/20 1800)  Speech: Clear (11/10/20 1800)  LUE Motor Response: Purposeful (11/10/20 1800)  LLE Motor Response: Purposeful (11/10/20 1800)  RUE Motor Response: Purposeful (11/10/20 1800)  RLE Motor Response: Purposeful (11/10/20 1800)   At baseline    Mental Status, Level of Consciousness: Alert and  oriented to person, place, and time    Pulmonary Status:   O2 Device: Nasal cannula (11/10/20 1800)   Adequate oxygenation and airway patent    Complications related to anesthesia: None    Post-anesthesia assessment completed. No concerns    Signed By: Mony Shannon MD     November 10, 2020              Procedure(s):  L4-S1 REVISION LAMINECTOMY, L4-S1 FUSION . general    <BSHSIANPOST>    INITIAL Post-op Vital signs:   Vitals Value Taken Time   /50 11/10/2020  6:15 PM   Temp 36.5 °C (97.7 °F) 11/10/2020  6:10 PM   Pulse 85 11/10/2020  6:18 PM   Resp 12 11/10/2020  6:18 PM   SpO2 96 % 11/10/2020  6:18 PM   Vitals shown include unvalidated device data.

## 2020-11-10 NOTE — ROUTINE PROCESS
Patient: Shira Rosen MRN: [id number]  SSN: [id number] YOB: 1950  Age: 65 y.o. Sex: female Patient is status post Procedure(s): 
L4-S1 REVISION LAMINECTOMY, L4-S1 FUSION . Surgeon(s) and Role: Ezekiel Friedman MD - Primary Local/Dose/Irrigation:  See Herscallie Gathers Peripheral IV 11/10/20 Left;Posterior Wrist (Active) Hemovac Back (Active) Airway - Endotracheal Tube 11/10/20 Oral (Active) Dressing/Packing:  Wound Back-Dressing/Treatment: ABD pad; Other (Comment)(zip 24) (11/10/20 6124) Splint/Cast:  ] Other:

## 2020-11-10 NOTE — BRIEF OP NOTE
Brief Postoperative Note    Patient: Wili Moctezuma  YOB: 1950  MRN: [id number]    Date of Procedure: 11/10/2020     Pre-Op Diagnosis: SPONDYLOLISTHESIS, STENOSIS    Post-Op Diagnosis: Same as preoperative diagnosis. Procedure(s):  L4-S1 REVISION LAMINECTOMY, L4-S1 FUSION     Surgeon(s):  Malka Miramontes MD    Surgical Assistant: Physician Assistant: KIM Lima    Anesthesia: General     Estimated Blood Loss (mL): 649     Complications: None    Specimens: * No specimens in log *     Implants:   Implant Name Type Inv. Item Serial No.  Lot No. LRB No. Used Action   Akimbo x large 10cc   [id number]  NA N/A 1 Implanted   GRAFT BNE SUB M [id number] COMPRESSIBLE SPNG STRP PROVIDE - [id number]  GRAFT BNE SUB M [id number] COMPRESSIBLE SPNG STRP PROVIDE [id number] [employer]_WD NA N/A 1 Implanted   GRAFT BNE SUB 12ML [id number] B TRICALCIUM PHSPTE CLLGN HA - SNA  GRAFT BNE SUB 12ML [id number] B TRICALCIUM PHSPTE CLLGN KNOTT NA MEDTRONIC SOFAMOR DANEK_WD [id number] N/A 1 Implanted   SPACER SPNL [id number] LS PEEK INTBDY FUS W/ TANT MRK - SNA  SPACER SPNL [id number] LS PEEK INTBDY FUS W/ TANT MRK NA [employer] SOFAMOR DANEK_WD [id number] N/A 2 Implanted   SET SCR SPNL L6MM DIA5. 5MM TI BRK OFF CHEPE W/ DETACH [address] - SNA  SET SCR SPNL L6MM DIA5. 5MM TI BRK OFF CHEPE W/ DETACH [address] NA [employer] SOFAMOR DANEK_WD NA N/A 6 Implanted   SCREW SPNL L40MM DIA7. 5MM POST THORACOLUMBOSACRAL CO CHROM - SNA  SCREW SPNL L40MM DIA7. 5MM POST THORACOLUMBOSACRAL CO CHROM NA [employer] SOFAMOR DANEK_WD NA N/A 6 Implanted   GRAFT BNE SUB 12ML [id number] B TRICALCIUM PHSPTE CLLGN HA - SNA  GRAFT BNE SUB 12ML [id number] B TRICALCIUM PHSPTE CLLGN KNOTT NA [employer] SOFAMOR DANEK_WD [id number] N/A 1 Implanted   OMEGA SPNL L60MM DIA5. 5MM ANT POST THORACOLUMBOSACRAL TI - SNA  OMEGA SPNL L60MM DIA5. 5MM ANT POST THORACOLUMBOSACRAL TI NA [employer] SOFAMOR DANEK_WD NA N/A 2 Implanted       Drains:   Hemovac Back (Active)       Findings: Spondylolisthesis, stenosis     Electronically Signed by KIM Perez on 11/10/2020 at 4:56 PM

## 2020-11-10 NOTE — ANESTHESIA PREPROCEDURE EVALUATION
Relevant Problems   No relevant active problems       Anesthetic History   No history of anesthetic complications            Review of Systems / Medical History  Patient summary reviewed, nursing notes reviewed and pertinent labs reviewed    Pulmonary        Sleep apnea           Neuro/Psych   Within defined limits           Cardiovascular    Hypertension: well controlled          CAD         GI/Hepatic/Renal     GERD           Endo/Other        Arthritis     Other Findings              Physical Exam    Airway  Mallampati: II  TM Distance: > 6 cm  Neck ROM: normal range of motion   Mouth opening: Normal     Cardiovascular  Regular rate and rhythm,  S1 and S2 normal,  no murmur, click, rub, or gallop             Dental  No notable dental hx       Pulmonary  Breath sounds clear to auscultation               Abdominal  GI exam deferred       Other Findings            Anesthetic Plan    ASA: 3  Anesthesia type: general          Induction: Intravenous  Anesthetic plan and risks discussed with: Patient

## 2020-11-11 LAB
ANION GAP SERPL CALC-SCNC: 6 MMOL/L (ref 5–15)
BUN SERPL-MCNC: 18 MG/DL (ref 6–20)
BUN/CREAT SERPL: 19 (ref 12–20)
CALCIUM SERPL-MCNC: 8.2 MG/DL (ref 8.5–10.1)
CHLORIDE SERPL-SCNC: 109 MMOL/L (ref 97–108)
CO2 SERPL-SCNC: 24 MMOL/L (ref 21–32)
CREAT SERPL-MCNC: 0.96 MG/DL (ref 0.55–1.02)
GLUCOSE SERPL-MCNC: 127 MG/DL (ref 65–100)
HGB BLD-MCNC: 10.2 G/DL (ref 11.5–16)
POTASSIUM SERPL-SCNC: 5 MMOL/L (ref 3.5–5.1)
SODIUM SERPL-SCNC: 139 MMOL/L (ref 136–145)

## 2020-11-11 PROCEDURE — 74011250636 HC RX REV CODE- 250/636: Performed by: PHYSICIAN ASSISTANT

## 2020-11-11 PROCEDURE — 74011250637 HC RX REV CODE- 250/637: Performed by: PHYSICIAN ASSISTANT

## 2020-11-11 PROCEDURE — 97535 SELF CARE MNGMENT TRAINING: CPT

## 2020-11-11 PROCEDURE — 85018 HEMOGLOBIN: CPT

## 2020-11-11 PROCEDURE — 97116 GAIT TRAINING THERAPY: CPT

## 2020-11-11 PROCEDURE — 97165 OT EVAL LOW COMPLEX 30 MIN: CPT

## 2020-11-11 PROCEDURE — 36415 COLL VENOUS BLD VENIPUNCTURE: CPT

## 2020-11-11 PROCEDURE — 65270000032 HC RM SEMIPRIVATE

## 2020-11-11 PROCEDURE — 97530 THERAPEUTIC ACTIVITIES: CPT

## 2020-11-11 PROCEDURE — 97162 PT EVAL MOD COMPLEX 30 MIN: CPT

## 2020-11-11 PROCEDURE — 80048 BASIC METABOLIC PNL TOTAL CA: CPT

## 2020-11-11 RX ORDER — OMEPRAZOLE 40 MG/1
40 CAPSULE, DELAYED RELEASE ORAL
Status: DISCONTINUED | OUTPATIENT
Start: 2020-11-12 | End: 2020-11-14 | Stop reason: HOSPADM

## 2020-11-11 RX ORDER — ATORVASTATIN CALCIUM 10 MG/1
10 TABLET, FILM COATED ORAL DAILY
Status: DISCONTINUED | OUTPATIENT
Start: 2020-11-12 | End: 2020-11-14 | Stop reason: HOSPADM

## 2020-11-11 RX ORDER — VALSARTAN 80 MG/1
80 TABLET ORAL DAILY
Status: DISCONTINUED | OUTPATIENT
Start: 2020-11-12 | End: 2020-11-14 | Stop reason: HOSPADM

## 2020-11-11 RX ORDER — DEXTROMETHORPHAN HYDROBROMIDE, GUAIFENESIN 5; 100 MG/5ML; MG/5ML
650 LIQUID ORAL
Status: DISCONTINUED | OUTPATIENT
Start: 2020-11-11 | End: 2020-11-14 | Stop reason: HOSPADM

## 2020-11-11 RX ADMIN — BENZOCAINE AND MENTHOL 1 LOZENGE: 15; 3.6 LOZENGE ORAL at 03:51

## 2020-11-11 RX ADMIN — KETOROLAC TROMETHAMINE 15 MG: 30 INJECTION, SOLUTION INTRAMUSCULAR at 03:50

## 2020-11-11 RX ADMIN — DOCUSATE SODIUM 50MG AND SENNOSIDES 8.6MG 1 TABLET: 8.6; 5 TABLET, FILM COATED ORAL at 12:03

## 2020-11-11 RX ADMIN — VANCOMYCIN HYDROCHLORIDE 1500 MG: 10 INJECTION, POWDER, LYOPHILIZED, FOR SOLUTION INTRAVENOUS at 00:35

## 2020-11-11 RX ADMIN — Medication 10 ML: at 22:32

## 2020-11-11 RX ADMIN — OXYCODONE HYDROCHLORIDE 10 MG: 5 TABLET ORAL at 22:37

## 2020-11-11 RX ADMIN — OXYCODONE HYDROCHLORIDE 5 MG: 5 TABLET ORAL at 14:44

## 2020-11-11 RX ADMIN — KETOROLAC TROMETHAMINE 15 MG: 30 INJECTION, SOLUTION INTRAMUSCULAR at 16:55

## 2020-11-11 RX ADMIN — DOCUSATE SODIUM 50MG AND SENNOSIDES 8.6MG 1 TABLET: 8.6; 5 TABLET, FILM COATED ORAL at 17:00

## 2020-11-11 RX ADMIN — ACETAMINOPHEN 1000 MG: 500 TABLET ORAL at 03:51

## 2020-11-11 RX ADMIN — OXYCODONE HYDROCHLORIDE 10 MG: 5 TABLET ORAL at 10:11

## 2020-11-11 RX ADMIN — OXYCODONE HYDROCHLORIDE 10 MG: 5 TABLET ORAL at 18:33

## 2020-11-11 RX ADMIN — KETOROLAC TROMETHAMINE 15 MG: 30 INJECTION, SOLUTION INTRAMUSCULAR at 10:11

## 2020-11-11 NOTE — PROGRESS NOTES
Occupational Therapy    Orders received, chart reviewed and patient evaluated by occupational therapy. Pending progression with skilled acute occupational therapy, recommend: To be determined: HH OT vs none pending progress     Recommend with nursing patient to complete as able in order to maintain strength, endurance and independence: OOB to chair 3x/day with CGA and functional mobility to the bathroom with CGA. Thank you for your assistance. Full evaluation to follow.      Eva Santiago, ELISAR/L

## 2020-11-11 NOTE — PROGRESS NOTES
Orthopedic Spine Progress Note  Post Op day: 1 Day Post-Op    2020 8:26 AM   Admit Date: 11/10/2020  Procedure: Procedure(s):  L4-S1 REVISION LAMINECTOMY, L4-S1 FUSION     Subjective:     Adore Carpenter has no complaints. Pain is well managed. Some residual LLE discomfort. Tolerating diet. No N/V. Pain Control:   Pain Assessment  Pain Scale 1: Numeric (0 - 10)  Pain Intensity 1: 1  Pain Onset 1: post op  Pain Location 1: Back  Pain Orientation 1: Posterior  Pain Description 1: Aching  Pain Intervention(s) 1: Encouraged PCA    Objective:          Physical Exam:  General:  Alert and oriented. No acute distress. Heart:  Respirations unlabored. Abdomen:   Extremities: Soft, non-tender. No evidence of cyanosis. Pulses palpable in both upper and lower extremities. Neurologic:  Musculoskeletal:  No new motor deficits. Neurovascular exam within normal limits. Sensation stable. Motor: unchanged C5-T1 and L2-S1. Rivera's sign negative in bilateral lower extremities. Calves soft, nontender upon palpation and with passive twitch. Moves both upper and lower extremities. Incision: clean, dry, and intact. No significant erythema or swelling. No active drainage noted. Vital Signs:    Blood pressure 133/69, pulse 76, temperature 98.5 °F (36.9 °C), resp. rate 18, SpO2 100 %. Temp (24hrs), Av.1 °F (36.7 °C), Min:97.6 °F (36.4 °C), Max:98.6 °F (37 °C)      LAB:    Recent Labs     20  0338   HGB 10.2*     Lab Results   Component Value Date/Time    Sodium 139 2020 03:38 AM    Potassium 5.0 2020 03:38 AM    Chloride 109 (H) 2020 03:38 AM    CO2 24 2020 03:38 AM    Glucose 127 (H) 2020 03:38 AM    BUN 18 2020 03:38 AM    Creatinine 0.96 2020 03:38 AM    Calcium 8.2 (L) 2020 03:38 AM       Intake/Output:No intake/output data recorded.    1901 -  0700  In: 800 [I.V.:800]  Out: 555 [Urine:545; Drains:10]    PT/OT:   Gait: Assessment:   Patient is 1 Day Post-Op s/p Procedure(s):  L4-S1 REVISION LAMINECTOMY, L4-S1 FUSION     Plan:     1. Continue PT/OT  2. Continue established methods of pain control  3. VTE Prophylaxes - TEDS &/or SCDs   4.  Discharge pending  5.  6.       Signed By: KIM Nielson

## 2020-11-11 NOTE — PROGRESS NOTES
Transition of Care: home with home health/PT/OT; [facility] has accepted; info added to the AVS    Transport Plan: in car with daughter; Kami Young, [phone]    RUR: 6%    1130: CM met with patient at bedside; patient is alert and orineted x 4; patinet states she lives alone and is normallay independnent in her ADLs; her PCP is Keyona Ordoñez MD and last visit was in October 2020; preferred pharmacy is the [facility] at [address] [place] [address] on [address] .; order for PT/OT; patient has chosen [facility] (first choice), [facility]; referral sent through [facility]; awaiting response    Transition of Care Plan:     The Plan for Transition of Care is related to the following treatment goals: home health    The Patient  was provided with a choice of provider and agrees  with the discharge plan. Yes [x] No []    A Freedom of choice list was provided with basic dialogue that supports the patient's individualized plan of care/goals and shares the quality data associated with the providers. Yes [x] No []    Reason for Admission:   Spinal stenois                   RUR Score:   6%                  Plan for utilizing home health:   Home with home health/PT/OT       PCP: First and Last name:  Keyona Ordoñez MD   Name of Practice:    Are you a current patient: Yes/No: yes   Approximate date of last visit: October 2020   Can you participate in a virtual visit with your PCP: yes                    Current Advanced Directive/Advance Care Plan: full code; no acp docs                         Transition of Care Plan:  Home with home health/PT/OT; transport by daughter in car    Care Management Interventions  PCP Verified by CM: Yes(Fransico Abdul MD)  Mode of Transport at Discharge:  Other (see comment)(in car with daughter)  Transition of Care Consult (CM Consult): Home Health  Physical Therapy Consult: Yes  Occupational Therapy Consult: Yes  Current Support Network: Lives Alone  Confirm Follow Up Transport: Family(in car with daughter)  The Plan for Transition of Care is Related to the Following Treatment Goals : home health  The Patient and/or Patient Representative was Provided with a Choice of Provider and Agrees with the Discharge Plan?: Yes  Name of the Patient Representative Who was Provided with a Choice of Provider and Agrees with the Discharge Plan: Russ Ruiz  Freedom of Choice List was Provided with Basic Dialogue that Supports the Patient's Individualized Plan of Care/Goals, Treatment Preferences and Shares the Quality Data Associated with the Providers?: Yes  Discharge Location  Discharge Placement: Home with home health     Patient will need 2nd IM letter prior to discharge    CM following  Jade Sousa RN, CRM

## 2020-11-11 NOTE — PROGRESS NOTES
Problem: Mobility Impaired (Adult and Pediatric)  Goal: *Acute Goals and Plan of Care (Insert Text)  Description: FUNCTIONAL STATUS PRIOR TO ADMISSION: Patient was independent and active without use of DME.    HOME SUPPORT PRIOR TO ADMISSION: The patient lived alone with no local support. She will have her daughter to assist her some while recovering. Physical Therapy Goals  Initiated 11/11/2020    1. Patient will move from supine to sit and sit to supine  in bed with independence within 4 days. 2. Patient will perform sit to stand with modified independence within 4 days. 3. Patient will ambulate with independence for 200 feet with the least restrictive device within 4 days. 4. Patient will ascend/descend 3 stairs with 1 handrail(s) with modified independence within 4 days. 5. Patient will verbalize and demonstrate understanding of spinal precautions (No bending, lifting greater than 5 lbs, or twisting; log-roll technique; frequent repositioning as instructed) within 4 days. Outcome: Progressing Towards Goal   PHYSICAL THERAPY EVALUATION  Patient: Joanne Samayoa (22 y.o. female)  Date: 11/11/2020  Primary Diagnosis: Spinal stenosis, lumbar [M48.061]  Procedure(s) (LRB):  L4-S1 REVISION LAMINECTOMY, L4-S1 FUSION  (N/A) 1 Day Post-Op   Precautions:   Back(Brace)    ASSESSMENT  Based on the objective data described below, the patient presents with decreased independence with functional mobility post-op day 1 L4-S1 revision laminectomy and fusion. Patient education is provided on back precautions including no bending, lifting, or twisting and reducing sitting time to 30-45 minutes before taking a standing rest break. Back brace is donned in standing. She demonstrates the ability to transition supine to sit with use of the log roll technique and Min A to achieve upright. Patient reports that the pain she initially felt in the left upper thigh has reduced in sitting.  Vital signs are stable with positional changes. Patient demonstrates the ability to ambulate with reduced bilateral step length and CGA. She is returned to bedside chair with all needs met. Current Level of Function Impacting Discharge (mobility/balance): CGA    Functional Outcome Measure: The patient scored 23/28 on the Tinetti outcome measure. Other factors to consider for discharge: medical stability, increased gait and stair training     Patient will benefit from skilled therapy intervention to address the above noted impairments. PLAN :  Recommendations and Planned Interventions: bed mobility training, transfer training, gait training, therapeutic exercises, neuromuscular re-education, edema management/control, patient and family training/education, and therapeutic activities      Frequency/Duration: Patient will be followed by physical therapy:  twice daily to address goals. Recommendation for discharge: (in order for the patient to meet his/her long term goals)  Physical therapy at least 2 days/week in the home     This discharge recommendation:  Has not yet been discussed the attending provider and/or case management    IF patient discharges home will need the following DME: patient owns DME required for discharge         SUBJECTIVE:   Patient stated that pain has eased up, maybe its a muscle cramp.     OBJECTIVE DATA SUMMARY:   HISTORY:    Past Medical History:   Diagnosis Date    Arrhythmia     HX MITRAL VALVE PROLAPSE, HAS BEEN SINCE DIAGNOSED NEGATIVE     Arthritis     Autoimmune disease ([facility] [address]     HLA B27    Chronic pain     LEGS, AND WHOLE BODY     GERD (gastroesophageal reflux disease)     High cholesterol     Hypertension     Sleep apnea      Past Surgical History:   Procedure Laterality Date    HX CATARACT REMOVAL Bilateral 2016    HX  SECTION  1982    X1    HX HEART CATHETERIZATION  2014    HX HEENT  2010    ORAL SURGERY     HX HERNIA REPAIR      HX HYSTERECTOMY      HX LUMBAR FUSION      HX TONSILLECTOMY      HX WISDOM TEETH EXTRACTION      IR INJ FORAMIN EPID LUMB ANES/STER SNGL  9/20/2019       Personal factors and/or comorbidities impacting plan of care: please see above    Home Situation  Home Environment: Private residence  # Steps to Enter: 3  Rails to Enter: Yes  Hand Rails : Bilateral  One/Two Story Residence: One story  Living Alone: Yes(daughter will be staying with her x1 month, not 24/7)  Support Systems: Family member(s)  Patient Expects to be Discharged to[de-identified] Private residence  Current DME Used/Available at Home: Raised toilet seat, Adaptive dressing aides, Adaptive bathing aides, Walker, rolling  Tub or Shower Type: Shower(built-in seat)    EXAMINATION/PRESENTATION/DECISION MAKING:   Critical Behavior:  Neurologic State: Alert  Orientation Level: Oriented X4  Cognition: Follows commands     Hearing: Auditory  Auditory Impairment: None  Skin:    Edema:   Range Of Motion:  AROM: Within functional limits           PROM: Within functional limits           Strength:    Strength: Within functional limits                    Tone & Sensation:   Tone: Normal              Sensation: Impaired               Coordination:  Coordination: Within functional limits  Vision:      Functional Mobility:  Bed Mobility:     Supine to Sit: Minimum assistance     Scooting: Stand-by assistance  Transfers:  Sit to Stand: Contact guard assistance  Stand to Sit: Contact guard assistance        Bed to Chair: Contact guard assistance              Balance:   Sitting: Intact  Standing: Intact; With support  Ambulation/Gait Training:  Distance (ft): 100 Feet (ft)  Assistive Device: Gait belt  Ambulation - Level of Assistance: Contact guard assistance        Gait Abnormalities: Decreased step clearance        Base of Support: Widened     Speed/Renetta: Slow  Step Length: Right shortened;Left shortened                     Stairs:               Therapeutic Exercises:       Functional Measure:  Tinetti test:    Sitting Balance: 1  Arises: 1  Attempts to Rise: 2  Immediate Standing Balance: 2  Standing Balance: 1  Nudged: 2  Eyes Closed: 1  Turn 360 Degrees - Continuous/Discontinuous: 1  Turn 360 Degrees - Steady/Unsteady: 1  Sitting Down: 1  Balance Score: 13 Balance total score  Indication of Gait: 1  R Step Length/Height: 1  L Step Length/Height: 1  R Foot Clearance: 1  L Foot Clearance: 1  Step Symmetry: 1  Step Continuity: 1  Path: 1  Trunk: 2  Walking Time: 0  Gait Score: 10 Gait total score  Total Score: 23/28 Overall total score         Tinetti Tool Score Risk of Falls  <19 = High Fall Risk  19-24 = Moderate Fall Risk  25-28 = Low Fall Risk  Tinetti ME. Performance-Oriented Assessment of Mobility Problems in Elderly Patients. Shah 64; [id number]. (Scoring Description: PT Bulletin Feb. 10, 1993)    Older adults: Yarelis Atkins et al, 2009; n = 1000 [facility] elderly evaluated with ABC, GREGORIA, ADL, and IADL)  · Mean GREGORIA score for males aged 69-68 years = 26.21(3.40)  · Mean GREGORIA score for females age 69-68 years = 25.16(4.30)  · Mean GREGORIA score for males over 80 years = 23.29(6.02)  · Mean GREGORIA score for females over 80 years = 17.20(8.32)            Physical Therapy Evaluation Charge Determination   History Examination Presentation Decision-Making   MEDIUM  Complexity : 1-2 comorbidities / personal factors will impact the outcome/ POC  LOW Complexity : 1-2 Standardized tests and measures addressing body structure, function, activity limitation and / or participation in recreation  MEDIUM Complexity : Evolving with changing characteristics  Other outcome measures Tinetti  LOW       Based on the above components, the patient evaluation is determined to be of the following complexity level: LOW     Pain Rating:      Activity Tolerance:   Good    After treatment patient left in no apparent distress:   Sitting in chair and Call bell within reach    COMMUNICATION/EDUCATION:   The patients plan of care was discussed with: Registered nurse. Fall prevention education was provided and the patient/caregiver indicated understanding., Patient/family have participated as able in goal setting and plan of care. , and Patient/family agree to work toward stated goals and plan of care.     Thank you for this referral.  Amparo Jacobo, PT   Time Calculation: 38 mins

## 2020-11-11 NOTE — PROGRESS NOTES
Problem: Mobility Impaired (Adult and Pediatric)  Goal: *Acute Goals and Plan of Care (Insert Text)  Description: FUNCTIONAL STATUS PRIOR TO ADMISSION: Patient was independent and active without use of DME.    HOME SUPPORT PRIOR TO ADMISSION: The patient lived alone with no local support. She will have her daughter to assist her some while recovering. Physical Therapy Goals  Initiated 11/11/2020    1. Patient will move from supine to sit and sit to supine  in bed with independence within 4 days. 2. Patient will perform sit to stand with modified independence within 4 days. 3. Patient will ambulate with independence for 200 feet with the least restrictive device within 4 days. 4. Patient will ascend/descend 3 stairs with 1 handrail(s) with modified independence within 4 days. 5. Patient will verbalize and demonstrate understanding of spinal precautions (No bending, lifting greater than 5 lbs, or twisting; log-roll technique; frequent repositioning as instructed) within 4 days. 11/11/2020 1441 by Carmen Zacarias, PT  Outcome: Progressing Towards Goal   PHYSICAL THERAPY TREATMENT  Patient: Lubna Lozano (58 y.o. female)  Date: 11/11/2020  Diagnosis: Spinal stenosis, lumbar [M48.061]   <principal problem not specified>  Procedure(s) (LRB):  L4-S1 REVISION LAMINECTOMY, L4-S1 FUSION  (N/A) 1 Day Post-Op  Precautions: Back(Brace)  Chart, physical therapy assessment, plan of care and goals were reviewed. ASSESSMENT  Patient continues with skilled PT services and is progressing towards goals. Patient is received seated EOB with brace donned. She demonstrates the ability to ambulate increased distance with improved gait speed this afternoon. She continues to deny LE pain with mobility. Patient will be ready for stair training in AM although her daughter is her ride and cannot come until the afternoon.       Current Level of Function Impacting Discharge (mobility/balance): CGA    Other factors to consider for discharge: medical stability, stair training          PLAN :  Patient continues to benefit from skilled intervention to address the above impairments. Continue treatment per established plan of care. to address goals. Recommendation for discharge: (in order for the patient to meet his/her long term goals)  Physical therapy at least 2 days/week in the home     This discharge recommendation:  Has been made in collaboration with the attending provider and/or case management    IF patient discharges home will need the following DME: patient owns DME required for discharge       SUBJECTIVE:   Patient stated i'm not ready to do the stairs.     OBJECTIVE DATA SUMMARY:   Critical Behavior:  Neurologic State: Alert, Appropriate for age  Orientation Level: Oriented X4  Cognition: Follows commands, Appropriate decision making, Appropriate for age attention/concentration, Appropriate safety awareness  Safety/Judgement: Awareness of environment, Good awareness of safety precautions, Home safety, Insight into deficits, Fall prevention  Functional Mobility Training:  Bed Mobility:  Rolling: Stand-by assistance  Supine to Sit: Minimum assistance     Scooting: Stand-by assistance        Transfers:  Sit to Stand: Contact guard assistance  Stand to Sit: Contact guard assistance        Bed to Chair: Contact guard assistance                    Balance:  Sitting: Intact  Standing: Intact; With support  Ambulation/Gait Training:  Distance (ft): 200 Feet (ft)  Assistive Device: Gait belt  Ambulation - Level of Assistance: Contact guard assistance        Gait Abnormalities: Decreased step clearance        Base of Support: Widened     Speed/Renetta: Slow  Step Length: Right shortened;Left shortened                    Stairs:               Therapeutic Exercises:     Pain Rating:      Activity Tolerance:   Good    After treatment patient left in no apparent distress:   Sitting in chair, Call bell within reach, and Caregiver / family present    COMMUNICATION/COLLABORATION:   The patients plan of care was discussed with: Registered nurse.      Mayela Parmar, PT   Time Calculation: 17 mins

## 2020-11-11 NOTE — PROGRESS NOTES
Problem: Falls - Risk of  Goal: *Absence of Falls  Description: Document Fernando Phan Fall Risk and appropriate interventions in the flowsheet. Outcome: Progressing Towards Goal  Note: Fall Risk Interventions:  Mobility Interventions: Communicate number of staff needed for ambulation/transfer, Patient to call before getting OOB         Medication Interventions: Patient to call before getting OOB    Elimination Interventions: Call light in reach, Patient to call for help with toileting needs              Problem: Patient Education: Go to Patient Education Activity  Goal: Patient/Family Education  Outcome: Progressing Towards Goal     Problem: Pressure Injury - Risk of  Goal: *Prevention of pressure injury  Description: Document Mark Scale and appropriate interventions in the flowsheet. Outcome: Progressing Towards Goal  Note: Pressure Injury Interventions:             Activity Interventions: PT/OT evaluation    Mobility Interventions: HOB 30 degrees or less, Float heels, PT/OT evaluation    Nutrition Interventions: Offer support with meals,snacks and hydration                     Problem: Patient Education: Go to Patient Education Activity  Goal: Patient/Family Education  Outcome: Progressing Towards Goal

## 2020-11-11 NOTE — ROUTINE PROCESS
Patient called out regarding beeping noise in room, upon entering the room the IV pump and pulse ox are running appropriately with no alarms sounding. Patient continued to complain of a sound coming from the IV pump and was explained that the pump was running appropriately and no alarms were sounding.

## 2020-11-11 NOTE — PROGRESS NOTES
Patient's daughter brought in patient's home medications; patient is requesting her own medications and is refusing hospital medications. I spoke with the pharmacy regarding this and have added her home meds to be verified.

## 2020-11-11 NOTE — PROGRESS NOTES
Problem: Self Care Deficits Care Plan (Adult)  Goal: *Acute Goals and Plan of Care (Insert Text)  Description: FUNCTIONAL STATUS PRIOR TO ADMISSION: Patient was independent and active without use of DME.    HOME SUPPORT: The patient lived alone. Reports daughter will be staying with her for several weeks - a month to assist as needed, however daughter will not be available 24/7. Reports having supportive neighbors who will be helping with cooking. Occupational Therapy Goals  Initiated 11/11/2020  1. Patient will perform lower body dressing with modified independence using AE prn within 7 days. 2.  Patient will perform toilet transfer with modified independence within 7 days. 3.  Patient will perform all aspects of toileting with modified independence within 7 days. 4.  Patient will don/doff back brace at modified independence within 7 days. 5.  Patient will verbalize/demonstrate 3/3 back precautions during ADL tasks without cues within 7 days. Outcome: Progressing Towards Goal     OCCUPATIONAL THERAPY EVALUATION  Patient: Preeti Prescott (89 y.o. female)  Date: 11/11/2020  Primary Diagnosis: Spinal stenosis, lumbar [M48.061]  Procedure(s) (LRB):  L4-S1 REVISION LAMINECTOMY, L4-S1 FUSION  (N/A) 1 Day Post-Op   Precautions: Back(Brace)    ASSESSMENT  Based on the objective data described below, the patient presents with impaired functional mobility and balance, decreased activity tolerance, LLE pain, and decreased functional reach to lower body following revision lumbar laminectomy. Patient with good insight into deficits and excellent awareness of safety precautions - patient able to verbalize spinal precautions with minimal cues and utilizing novel strategies successfully during functional activity. Patient receptive to education regarding home set-up, compensatory ADL strategies, available AE/DME to maximize independence in self-care, and grading return to roles/routines.  Overall, patient completing functional mobility with CGA and additional time 2/2 increased caution. Without AE, up to mod assist needed for lower body dressing - plan to provide AE training next session. Current Level of Function Impacting Discharge (ADLs/self-care): CGA for dynamic mobility; up to mod assist for lower body tasks    Functional Outcome Measure: The patient scored 55/100 on the Barthel Index. Other factors to consider for discharge: independent at baseline     Patient will benefit from skilled therapy intervention to address the above noted impairments. PLAN :  Recommendations and Planned Interventions: self care training, functional mobility training, balance training, therapeutic activities, endurance activities, patient education, and home safety training    Frequency/Duration: Patient will be followed by occupational therapy 5 times a week to address goals. Recommendation for discharge: (in order for the patient to meet his/her long term goals)  No skilled occupational therapy/ follow up rehabilitation needs identified at this time. This discharge recommendation:  Has not yet been discussed the attending provider and/or case management    IF patient discharges home will need the following DME: patient owns DME required for discharge; recommend toilet tongs       SUBJECTIVE:   Patient stated I don't like to ask other people for help, but I'm not stupid. I'm not going to do something if it doesn't feel safe.     OBJECTIVE DATA SUMMARY:   HISTORY:   Past Medical History:   Diagnosis Date    Arrhythmia     HX MITRAL VALVE PROLAPSE, HAS BEEN SINCE DIAGNOSED NEGATIVE     Arthritis     Autoimmune disease ([facility] [address]     HLA B27    Chronic pain     LEGS, AND WHOLE BODY     GERD (gastroesophageal reflux disease)     High cholesterol     Hypertension     Sleep apnea      Past Surgical History:   Procedure Laterality Date    HX CATARACT REMOVAL Bilateral 2016    HX  SECTION  1982    X1    HX HEART CATHETERIZATION  2014    HX HEENT  2010    ORAL SURGERY     HX HERNIA REPAIR      HX HYSTERECTOMY      HX LUMBAR FUSION      HX TONSILLECTOMY      HX WISDOM TEETH EXTRACTION      IR INJ FORAMIN EPID LUMB ANES/STER SNGL  9/20/2019       Expanded or extensive additional review of patient history:   Home Situation  Home Environment: Private residence  # Steps to Enter: 3  Rails to Enter: Yes  Hand Rails : Bilateral  One/Two Story Residence: One story  Living Alone: Yes(daughter will be staying with her x1 month, not 24/7)  Support Systems: Family member(s)  Patient Expects to be Discharged to[de-identified] Private residence  Current DME Used/Available at Home: Raised toilet seat, Adaptive dressing aides, Adaptive bathing aides, Walker, rolling  Tub or Shower Type: Shower(built-in seat)    EXAMINATION OF PERFORMANCE DEFICITS:  Cognitive/Behavioral Status:  Neurologic State: Alert; Appropriate for age  Orientation Level: Oriented X4  Cognition: Follows commands; Appropriate decision making; Appropriate for age attention/concentration; Appropriate safety awareness  Perception: Appears intact  Perseveration: No perseveration noted  Safety/Judgement: Awareness of environment;Good awareness of safety precautions; Home safety; Insight into deficits; Fall prevention    Hearing: Auditory  Auditory Impairment: None    Vision/Perceptual:    Acuity: Within Defined Limits       Range of Motion:  AROM: Generally decreased, functional  PROM: Within functional limits    Strength:  Strength: Within functional limits     Coordination:  Coordination: Within functional limits  Fine Motor Skills-Upper: Left Intact; Right Intact    Gross Motor Skills-Upper: Left Intact; Right Intact    Tone & Sensation:  Tone: Normal  Sensation: Impaired    Balance:  Sitting: Intact  Standing: Intact; With support    Functional Mobility and Transfers for ADLs:  Bed Mobility:  Rolling: Stand-by assistance  Supine to Sit: Minimum assistance  Scooting: Stand-by assistance    Transfers:  Sit to Stand: Contact guard assistance  Stand to Sit: Contact guard assistance  Bed to Chair: Contact guard assistance  Bathroom Mobility: Contact guard assistance  Toilet Transfer : Contact guard assistance    ADL Assessment:  Feeding: Setup    Oral Facial Hygiene/Grooming: Setup;Stand-by assistance(SBA if standing)    Upper Body Dressing: Stand-by assistance    Lower Body Dressing: Moderate assistance    Toileting: Minimum assistance; Moderate assistance    ADL Intervention and task modifications:  Patient instructed and demonstrated 3/3 back precautions with occasional verbal cues. Feeding  Feeding Assistance: Set-up  Drink to Mouth: Independent    Upper Body Dressing Assistance  Dressing Assistance: Stand-by assistance  Orthotics(Brace): Stand-by assistance  Cues: Verbal cues provided;Visual cues provided    Lower Body Dressing Assistance  Dressing Assistance: Moderate assistance  Socks: Moderate assistance(able to tailor sit RLE; unable LLE)  Position Performed: Seated in chair  Cues: Verbal cues provided;Visual cues provided;Physical assistance    Toileting  Bowel Hygiene: Moderate assistance(simulated; poor reach)  Clothing Management: Contact guard assistance    Cognitive Retraining  Safety/Judgement: Awareness of environment;Good awareness of safety precautions; Home safety; Insight into deficits; Fall prevention    Patient instructed and indicated understanding the benefits of maintaining activity tolerance, functional mobility, and independence with self care tasks during acute stay  to ensure safe return home and to baseline. Encouraged patient to increase frequency and duration OOB, not sitting longer than 30 mins without marching/walking with staff, be out of bed for all meals, perform daily ADLs (as approved by RN/MD regarding bathing etc), and performing functional mobility to/from bathroom.  Patient instruction and indicated understanding on body mechanics, ergonomics and gravitational force on the spine during different body positions to plan activities in prep for return home to complete basic ADLs, instrumental ADLs and back to work safely. Dressing brace: Patient instructed and demonstrated to don/doff velcro on brace using dominant side, keeping non-dominant side intact. Patient instructed and demonstrated in meantime of being able to stand with back against wall to don/doff brace, to don/doff seated using lap and bed/chair surface to support brace while manipulating. Toileting: Patient instructed on the benefits of using flushable wet wipes and toilet tongs if decreased reach or pain for sean care. Also, the benefits of a reacher to aid in clothing management. Patient instruction and indicated understanding to not strain i.e. holding breath to bear down during a bowel movement, lifting/activity, and sexual activity. Home safety: Patient instructed and indicated understanding on home modifications and safety [raise height of ADL objects (i.e. clothing, sink items, fridge items, items to mouth when grooming), appropriate height of chair surfaces, recliner safety, change of floor surfaces, clear pathways] to increase independence and fall prevention. Standing: Patient instructed and indicated understanding to walk up to sink/counter top/surfaces, step into walker, square off while using objects, slide objects along surfaces, to increase adherence to back precautions and fall prevention. Patient instructed to increase amount of time standing in order to increase independence and tolerance with ADLs. During prolonged standing, can open cabinet door or place foot on stool to decrease spinal pressure/increase pain. Therapeutic Exercise:  Patient instructed on the benefits shoulder flexion exercises to increase independence with ADLs, active ROM, and strength of spine.  Patient instructed and indicated understanding how to progress reps, sets, against gravity to then working up to 5 lbs until surgeon clears for increased weight, supine to sitting and then standing. Can use household items as weights. Functional Measure:  Barthel Index:    Bathin  Bladder: 0(pierre)  Bowels: 10  Groomin  Dressin  Feeding: 10  Mobility: 10  Stairs: 0  Toilet Use: 5  Transfer (Bed to Chair and Back): 10  Total: 55/100        The Barthel ADL Index: Guidelines  1. The index should be used as a record of what a patient does, not as a record of what a patient could do. 2. The main aim is to establish degree of independence from any help, physical or verbal, however minor and for whatever reason. 3. The need for supervision renders the patient not independent. 4. A patient's performance should be established using the best available evidence. Asking the patient, friends/relatives and nurses are the usual sources, but direct observation and common sense are also important. However direct testing is not needed. 5. Usually the patient's performance over the preceding 24-48 hours is important, but occasionally longer periods will be relevant. 6. Middle categories imply that the patient supplies over 50 per cent of the effort. 7. Use of aids to be independent is allowed. Bibi Boogie., Barthel, D.W. (0310). Functional evaluation: the Barthel Index. [address] [facility] (14)2. JACOB Watson, Fausto Kern.Bryson., [place], [address] [place] [address] (). Measuring the change indisability after inpatient rehabilitation; comparison of the responsiveness of the Barthel Index and Functional Nordman Measure. Journal of Neurology, Neurosurgery, and Psychiatry, 66(4), 528-525. TIERRA Venegas.EL.A, SHIRIN Mulligan, & Deane Saint, M.A. (2004.) Assessment of post-stroke quality of life in cost-effectiveness studies: The usefulness of the Barthel Index and the EuroQoL-5D.  Quality of Life Research, 15, 060-43     Occupational Therapy Evaluation Charge Determination   History Examination Decision-Making   LOW Complexity : Brief history review  LOW Complexity : 1-3 performance deficits relating to physical, cognitive , or psychosocial skils that result in activity limitations and / or participation restrictions  LOW Complexity : No comorbidities that affect functional and no verbal or physical assistance needed to complete eval tasks       Based on the above components, the patient evaluation is determined to be of the following complexity level: LOW   Pain Rating:  Patient reporting pain in LLE. Activity Tolerance:   Good and requires rest breaks    After treatment patient left in no apparent distress:    Sitting in chair and Call bell within reach    COMMUNICATION/EDUCATION:   The patients plan of care was discussed with: Physical therapist and Registered nurse. Home safety education was provided and the patient/caregiver indicated understanding., Patient/family have participated as able in goal setting and plan of care. , and Patient/family agree to work toward stated goals and plan of care.     Thank you for this referral.  Hayden Almaraz, OT  Time Calculation: 46 mins

## 2020-11-11 NOTE — PROGRESS NOTES
Physical Therapy Note    PT orders received and acknowledged and chart reviewed. Patient is evaluated. Full documentation to follow. Will need HH PT at D/C.      Thank you,  Fidelia HOGUET

## 2020-11-12 LAB — HGB BLD-MCNC: 8.8 G/DL (ref 11.5–16)

## 2020-11-12 PROCEDURE — 74011250637 HC RX REV CODE- 250/637: Performed by: PHYSICIAN ASSISTANT

## 2020-11-12 PROCEDURE — 36415 COLL VENOUS BLD VENIPUNCTURE: CPT

## 2020-11-12 PROCEDURE — 97530 THERAPEUTIC ACTIVITIES: CPT | Performed by: PHYSICAL THERAPIST

## 2020-11-12 PROCEDURE — 97535 SELF CARE MNGMENT TRAINING: CPT

## 2020-11-12 PROCEDURE — 65270000032 HC RM SEMIPRIVATE

## 2020-11-12 PROCEDURE — 85018 HEMOGLOBIN: CPT

## 2020-11-12 PROCEDURE — 97116 GAIT TRAINING THERAPY: CPT | Performed by: PHYSICAL THERAPIST

## 2020-11-12 RX ORDER — OXYCODONE HYDROCHLORIDE 5 MG/1
5-10 TABLET ORAL
Qty: 50 TAB | Refills: 0 | Status: SHIPPED | OUTPATIENT
Start: 2020-11-12 | End: 2020-11-26

## 2020-11-12 RX ADMIN — Medication 5 ML: at 14:13

## 2020-11-12 RX ADMIN — OXYCODONE HYDROCHLORIDE 10 MG: 5 TABLET ORAL at 17:49

## 2020-11-12 RX ADMIN — DOCUSATE SODIUM 50MG AND SENNOSIDES 8.6MG 1 TABLET: 8.6; 5 TABLET, FILM COATED ORAL at 08:47

## 2020-11-12 RX ADMIN — OXYCODONE HYDROCHLORIDE 10 MG: 5 TABLET ORAL at 20:52

## 2020-11-12 RX ADMIN — ATORVASTATIN CALCIUM 10 MG: 10 TABLET, FILM COATED ORAL at 08:46

## 2020-11-12 RX ADMIN — OMEPRAZOLE 40 MG: 40 CAPSULE, DELAYED RELEASE ORAL at 07:21

## 2020-11-12 RX ADMIN — POLYETHYLENE GLYCOL 3350 17 G: 17 POWDER, FOR SOLUTION ORAL at 08:47

## 2020-11-12 RX ADMIN — OXYCODONE HYDROCHLORIDE 10 MG: 5 TABLET ORAL at 12:57

## 2020-11-12 RX ADMIN — OXYCODONE HYDROCHLORIDE 10 MG: 5 TABLET ORAL at 08:48

## 2020-11-12 RX ADMIN — VALSARTAN 80 MG: 80 TABLET ORAL at 08:46

## 2020-11-12 RX ADMIN — Medication 10 ML: at 07:21

## 2020-11-12 RX ADMIN — DOCUSATE SODIUM 50MG AND SENNOSIDES 8.6MG 1 TABLET: 8.6; 5 TABLET, FILM COATED ORAL at 17:49

## 2020-11-12 NOTE — PROGRESS NOTES
Problem: Mobility Impaired (Adult and Pediatric)  Goal: *Acute Goals and Plan of Care (Insert Text)  Description: FUNCTIONAL STATUS PRIOR TO ADMISSION: Patient was independent and active without use of DME.    HOME SUPPORT PRIOR TO ADMISSION: The patient lived alone with no local support. She will have her daughter to assist her some while recovering. Physical Therapy Goals  Initiated 11/11/2020    1. Patient will move from supine to sit and sit to supine  in bed with independence within 4 days. 2. Patient will perform sit to stand with modified independence within 4 days. 3. Patient will ambulate with independence for 200 feet with the least restrictive device within 4 days. 4. Patient will ascend/descend 3 stairs with 1 handrail(s) with modified independence within 4 days. 5. Patient will verbalize and demonstrate understanding of spinal precautions (No bending, lifting greater than 5 lbs, or twisting; log-roll technique; frequent repositioning as instructed) within 4 days. Outcome: Progressing Towards Goal   PHYSICAL THERAPY TREATMENT  Patient: Aurelia Huertas (42 y.o. female)  Date: 11/12/2020  Diagnosis: Spinal stenosis, lumbar [M48.061]   <principal problem not specified>  Procedure(s) (LRB):  L4-S1 REVISION LAMINECTOMY, L4-S1 FUSION  (N/A) 2 Days Post-Op  Precautions: Back(Brace) No bending, no lifting greater than 5 lbs, no twisting, log-roll technique, repositioning every 20-30 min except when sleeping, brace when OOB (if ordered)  Chart, physical therapy assessment, plan of care and goals were reviewed. ASSESSMENT  Patient continues with skilled PT services and is progressing towards goals. Patient reporting increased pain today and states that she is no longer scheduled to DC today. Continues to require only CGA for ambulation with device but is slow and antalgic with gait. Continue to recommend HH PT vs no services pending progress. Will continue to follow.      Other factors to consider for discharge: at risk for falls, increased pain this session         PLAN :  Patient continues to benefit from skilled intervention to address the above impairments. Continue treatment per established plan of care. to address goals. Recommendation for discharge: (in order for the patient to meet his/her long term goals)  Physical therapy at least 2 days/week in the home vs no service pending progress    IF patient discharges home will need the following DME: patient owns DME required for discharge       SUBJECTIVE:   Patient stated I am feeling more pain today.     OBJECTIVE DATA SUMMARY:   Critical Behavior:  Neurologic State: Alert  Orientation Level: Oriented X4  Cognition: Follows commands  Safety/Judgement: Awareness of environment, Good awareness of safety precautions, Home safety, Insight into deficits, Fall prevention    Spinal diagnosis intervention:  The patient stated 3/3 back precautions when prompted. Reviewed all 3 back precautions, log roll technique, and sitting for 30 minutes at a time. Functional Mobility Training:         Transfers:  Sit to Stand: Contact guard assistance  Stand to Sit: Contact guard assistance                             Balance:  Sitting: Intact  Standing: Intact; With support  Ambulation/Gait Training:  Distance (ft): 120 Feet (ft)  Assistive Device: Brace/Splint;Gait belt  Ambulation - Level of Assistance: Contact guard assistance        Gait Abnormalities: Decreased step clearance        Base of Support: Widened     Speed/Renetta: Pace decreased (<100 feet/min); Slow  Step Length: Left shortened;Right shortened          Pain Rating:  Reports high pain levels but does not rate    Activity Tolerance:   Fair and requires rest breaks    After treatment patient left in no apparent distress:   Sitting in chair and Call bell within reach    COMMUNICATION/COLLABORATION:   The patients plan of care was discussed with: Physical therapist, Occupational therapist, and Registered nurse.      Al Slater PT, DPT   Time Calculation: 13 mins

## 2020-11-12 NOTE — PROGRESS NOTES
Problem: High Risk or History of EDGAR  Goal: Maintenance Care of EDGAR  Outcome: Progressing Towards Goal     Problem: Arrhythmia Pathway (Adult)  Goal: *Absence of arrhythmia  Outcome: Progressing Towards Goal     Problem: Hypertension  Goal: *Blood pressure within specified parameters  Outcome: Progressing Towards Goal     Problem: Complex Spine Procedure:  Post Op Day 2  Goal: Medications  Outcome: Progressing Towards Goal

## 2020-11-12 NOTE — DISCHARGE INSTRUCTIONS
After Hospital Care Plan:  Discharge Instructions Lumbar Fusion Surgery   Dr. John Gilbert   Patient Name: uLpe Larson    Date of procedure: 11/10/2020  Date of discharge:     Procedure: Procedure(s):  L4-S1 REVISION LAMINECTOMY, L4-S1 FUSION   PCP: David Ruiz MD    Follow up appointments  -follow up with Dr. Dr. John Gilbert in 2 weeks. Call [phone] to make an appointment as soon as you get home from the hospital.    [address] [place], [place] [address]y: ____________________   phone: _______________________  The agency will contact you to arrange dates/times for visits. Please call them if you do not hear from them within 24 hours after you are discharged  Physical therapy 3 times a week for 3 weeks  Nursing-initial assessment and as needed    When to call your Orthopaedic Surgeon:  -Signs of infection-if your incision is red; continues to have drainage; drainage has a foul odor or if you have a persistent fever over 101 degrees for 24 hours  -Nausea or vomiting, severe headache  -Loss of bowel or bladder function, inability to urinate  -Changes in sensation in your arms or legs (numbness, tingling, loss of color)  -Increased weakness-greater than before your surgery  -Severe pain or pain not relieved by medications  -Signs of a blood clot in your leg-calf pain, tenderness, redness, swelling of lower leg    When to call your Primary Care Physician:  -Concerns about medical conditions such as diabetes, high blood pressure, asthma, congestive heart failure  -Call if blood sugars are elevated, persistent headache or dizziness, coughing or congestion, constipation or diarrhea, burning with urination, abnormal heart rate    When to call 911 and go to the nearest emergency room:  -Acute onset of chest pain, shortness of breath, difficulty breathing    Activity  -You are going home a well person, be as active as possible. Your only exercise should be walking.   Start with short frequent walks and increase your walking distance each day.  -Limit the amount of time you sit to 20-30 minute intervals. Sitting for prolonged periods of time will be uncomfortable for you following surgery.  -Do NOT lift anything over 5 pounds  -Do NOT do any straining, twisting or bending  -When you are in bed, you may lay on your back or on either side. Do NOT lie on your stomach    Brace  -If you have a back brace, you should wear your brace at all times when you are out of bed. Do not wear the brace while in bed or showering.  -Remember to always wear a cotton t-shirt underneath your brace.  -Do not bend or twist when your brace is off    Diet  -Resume usual diet; drink plenty of fluids; eat foods high in fiber  -It is important to have regular bowel movements. Pain medications may cause constipation. You may want to take a stool softener (such as Senokot-S or Colace) to prevent constipation.   -If constipation occurs, take a laxative (such as Dulcolax tablets, Milk of Magnesia, or a suppository). Laxatives should only be used if the above preventable measures have failed and you still have not had a bowel movement after three days    Driving  -You may not drive or return to work until instructed by your physician. However, you may ride in the car for short periods of time. Incision Care  Your incision has been closed with absorbable sutures and the Zipline skin closure system. This will assist with healing. The Ashanti Balk is to remain on your incision for 2 weeks. A dry dressing (ABD and tape) will be placed over it and should be changed daily, for at least the first several days after your surgery. If you have no incisional drainage, you may leave the incision open to air if you wish, still leaving the Zipline in place. Please make sure to wash your hands prior to touching your dressing. You may take brief showers but do not run the water directly onto the wound.  After your shower, blot your incision dry with a soft towel and replace the dry dressing. Do not allow the tape to come in contact with the Zipline. Do not rub or apply any lotions or ointments to your incision site. Do not soak or scrub your wound. The Zipline dressing will be removed during your two week follow-up appointment. If you experience drainage leaking from underneath the Zipline or if it peels off before 2 weeks, please contact your orthopedic surgeons office. Showering  -You may shower in approximately 4 days after your surgery.    -Leave the dressing on during your shower. Do NOT allow the water to run directly onto your dressing. Once you get out of the shower, gently pat the dressing dry. -Reminder- your brace can be removed while showering. Remember to not bend or twist while your brace is off.    -Do not take a tub bath. Preventing blood clots  -You have been given T.E.D. stockings to wear. Continue to wear these for 7 days after your discharge. Put them on in the morning and take them off at night.    -They are used to increase your circulation and prevent blood clots from forming in your legs  -T. E.D. stockings can be machine washed, temperature not to exceed 160° F (71°C) and machine dried for 15 to 20 minutes, temperature not to exceed 250° F (121°C). Pain management  -Take pain medication as prescribed; decrease the amount you use as your pain lessens  -DO not wait until you are in extreme pain to take your medication.  -Avoid alcoholic beverages while taking pain medication    Pain Medication Safety  DO:  -Read the Medication Guide   -Take your medicine exactly as prescribed   -Store your medicine away from children and in a safe place   -Flush unused medicine down the toilet   -Call your healthcare provider for medical advice about side effects. You may report side effects to FDA at 9-510-FDA-8465.   -Please be aware that many medications contain Tylenol.   We do not want you to over medicate so please read the information below as a guide. Do not take more than 4 Grams of Tylenol in a 24 hour period. (There are 1000 milligrams in one Gram)                                                                                                                                                                                                                                                Percocet contains 325 mg of Tylenol per tablet (do not take more than 12 tablets in 24 hours)  Lortab contains 500 mg of Tylenol per tablet (do not take more than 8 tablets in 24 hours)  Norco contains 325 mg of Tylenol per tablet (do not take more than 12 tablets in 24 hours). DO NOT:  -Do not give your medicine to others   -Do not take medicine unless it was prescribed for you   -Do not stop taking your medicine without talking to your healthcare provider   -Do not break, chew, crush, dissolve, or inject your medicine. If you cannot swallow your medicine whole, talk to your healthcare provider.  -Do not drink alcohol while taking this medicine  -Do not take anti-inflammatory medications or aspirin unless instructed by your     physician.

## 2020-11-12 NOTE — PROGRESS NOTES
Orthopedic Spine Progress Note  Post Op day: 2 Days Post-Op    2020 7:44 AM   Admit Date: 11/10/2020  Procedure: Procedure(s):  L4-S1 REVISION LAMINECTOMY, L4-S1 FUSION     Subjective:     Nelly Workman has complaints of general back pain. .   Tolerating diet. No N/V. Pain Control:   Pain Assessment  Pain Scale 1: Numeric (0 - 10)  Pain Intensity 1: 2  Pain Onset 1: postop  Pain Location 1: Back  Pain Orientation 1: Posterior  Pain Description 1: Aching  Pain Intervention(s) 1: Medication (see MAR)    Objective:          Physical Exam:  General:  Alert and oriented. No acute distress. Heart:  Respirations unlabored. Abdomen:   Extremities: Soft, non-tender. No evidence of cyanosis. Pulses palpable in both upper and lower extremities. Neurologic:  Musculoskeletal:  No new motor deficits. Neurovascular exam within normal limits. Sensation stable. Motor: unchanged C5-T1 and L2-S1. Rivera's sign negative in bilateral lower extremities. Calves soft, nontender upon palpation and with passive twitch. Moves both upper and lower extremities. Incision: clean, dry, and intact. Breakthrough drainage reported last night. Vital Signs:    Blood pressure (!) 101/59, pulse 75, temperature 98 °F (36.7 °C), resp. rate 16, SpO2 94 %. Temp (24hrs), Av.8 °F (36.6 °C), Min:97.6 °F (36.4 °C), Max:98 °F (36.7 °C)      LAB:    Recent Labs     20  0312   HGB 8.8*     Lab Results   Component Value Date/Time    Sodium 139 2020 03:38 AM    Potassium 5.0 2020 03:38 AM    Chloride 109 (H) 2020 03:38 AM    CO2 24 2020 03:38 AM    Glucose 127 (H) 2020 03:38 AM    BUN 18 2020 03:38 AM    Creatinine 0.96 2020 03:38 AM    Calcium 8.2 (L) 2020 03:38 AM       Intake/Output:No intake/output data recorded.   11/10 1901 -  0700  In: -   Out: 4127 [Urine:1125; Drains:70]    PT/OT:   Gait:  Gait  Base of Support: Widened  Speed/Renetta: Slow  Step Length: Right shortened, Left shortened  Gait Abnormalities: Decreased step clearance  Ambulation - Level of Assistance: Contact guard assistance  Distance (ft): 200 Feet (ft)  Assistive Device: Gait belt                 Assessment:   Patient is 2 Days Post-Op s/p Procedure(s):  L4-S1 REVISION LAMINECTOMY, L4-S1 FUSION     Plan:     1. Continue PT/OT  2. Continue established methods of pain control  3. VTE Prophylaxes - TEDS &/or SCDs   4. Monitor Hgb  5. Home health consult  6.   Possible home tomorrow      Signed By: Carlota Salvador MD

## 2020-11-12 NOTE — PROGRESS NOTES
Spine Surgery Progress Note  Adella Boast, PA-C    Admit Date: 11/10/2020   LOS: 2 days      Daily Progress Note: 2020    POD:2 Days Post-Op    S/P: Procedure(s):  L4-S1 REVISION LAMINECTOMY, L4-S1 FUSION     Subjective:     Per nurses report, patient's wound was draining and saturated her dressing last night. She is 2 days s/p L4-S1 revision laminectomy and L4-S1 fusion. I made Dr. Ashley Reese and his team aware of this and they recommend a Joyce wound vac dressing. Pain is moderate and spikes with movement, particularly in the low back and down the left leg. Patient denies numbness, tingling, chest pain, nausea, vomiting, difficulty swallowing, headache, and dyspnea. Pt resting comfortably in bed. Objective:     Vital signs  VSS Afebrile. Temp (24hrs), Av.1 °F (36.7 °C), Min:97.8 °F (36.6 °C), Max:98.6 °F (37 °C)   No intake/output data recorded. 11/10 1901 -  0700  In: -   Out: 9557 [Urine:1125; Drains:70]    Visit Vitals  /67 (BP 1 Location: Left arm, BP Patient Position: At rest)   Pulse 79   Temp 98.6 °F (37 °C)   Resp 16   SpO2 98%    O2 Flow Rate (L/min): 2 l/min O2 Device: Room air       Voiding status: +void  Output (mL)  Urine Voided: 225 ml (20 0905)  Last Bowel Movement Date: 20 (20 0848)     Pain control  Pain Assessment  Pain Scale 1: Numeric (0 - 10)  Pain Intensity 1: 5  Pain Onset 1: postop  Pain Location 1: Back  Pain Orientation 1: Lower  Pain Description 1: Aching  Pain Intervention(s) 1: Relaxation technique, Repositioned    PT/OT  Gait     Gait  Base of Support: Widened  Speed/Renetta: Pace decreased (<100 feet/min), Slow  Step Length: Left shortened, Right shortened  Gait Abnormalities: Decreased step clearance  Ambulation - Level of Assistance: Contact guard assistance  Distance (ft): 120 Feet (ft)  Assistive Device: Brace/Splint, Gait belt        Physical Exam:  Gen: No acute distress. Neuro: A&Ox3. Follows commands. Speech clear.  Affect normal.  ESTRELLA spontaneously. Strength 5/5 in UE and LE BL. Sensation intact. Gait deferred. Calves soft and supple; No pain with passive stretch  Skin: Incision is intact with a zipline dressing; bloody drainage saturating bottom half of dressing. Unable to express any drainage on exam.     24 hour results:    Recent Results (from the past 24 hour(s))   HEMOGLOBIN    Collection Time: 11/12/20  3:12 AM   Result Value Ref Range    HGB 8.8 (L) 11.5 - 16.0 g/dL          Assessment:     Active Problems:    Spinal stenosis, lumbar (11/10/2020)      Plan:     1) s/p L4-S1 revision laminectomy and fusion  -PT/OT     -Pain control - tylenol and oxycodone PRN   -Drain d/c'd yesterday   -Diet - tolerating regular diet   -Voiding without issue   -Incision dressing removed and Joyce wound vac dressing applied    -Joyce to remain on for 7 days. Will move up her post-op patient with Dr. Mariana Santiago. Wound instructions provided. Readiness for discharge:     [x] Vital Signs stable    [] Hgb stable    [x] + Voiding    [] Wound intact, drainage minimal    [x] Tolerating PO intake     [] Cleared by PT (OT if applicable)    [x] Adequate pain control on oral medication alone       Activity: up with assist  DVT ppx: SCDs  Dispo: discharge to home likely tomorrow; pending repeat Hgb, wound vac check tomorrow AM, and PT clearance.     Plan d/w Dr. Mariana Santiago and his team.      Darylene Grapes, PA

## 2020-11-12 NOTE — PROGRESS NOTES
Problem: Mobility Impaired (Adult and Pediatric)  Goal: *Acute Goals and Plan of Care (Insert Text)  Description: FUNCTIONAL STATUS PRIOR TO ADMISSION: Patient was independent and active without use of DME.    HOME SUPPORT PRIOR TO ADMISSION: The patient lived alone with no local support. She will have her daughter to assist her some while recovering. Physical Therapy Goals  Initiated 11/11/2020    1. Patient will move from supine to sit and sit to supine  in bed with independence within 4 days. 2. Patient will perform sit to stand with modified independence within 4 days. 3. Patient will ambulate with independence for 200 feet with the least restrictive device within 4 days. 4. Patient will ascend/descend 3 stairs with 1 handrail(s) with modified independence within 4 days. 5. Patient will verbalize and demonstrate understanding of spinal precautions (No bending, lifting greater than 5 lbs, or twisting; log-roll technique; frequent repositioning as instructed) within 4 days. 11/12/2020 1422 by Nicholas Villalta, PT, DPT  Outcome: Progressing Towards Goal  11/12/2020 0901 by Nicholas Villalta, PT, DPT  Outcome: Progressing Towards Goal   PHYSICAL THERAPY TREATMENT  Patient: Nelly Workman (76 y.o. female)  Date: 11/12/2020  Diagnosis: Spinal stenosis, lumbar [M48.061]   <principal problem not specified>  Procedure(s) (LRB):  L4-S1 REVISION LAMINECTOMY, L4-S1 FUSION  (N/A) 2 Days Post-Op  Precautions: Back(Brace) No bending, no lifting greater than 5 lbs, no twisting, log-roll technique, repositioning every 20-30 min except when sleeping, brace when OOB (if ordered)  Chart, physical therapy assessment, plan of care and goals were reviewed. ASSESSMENT  Patient continues with skilled PT services and is progressing towards goals. Patient continues to be limited by pain, gait instability, impaired balance and decreased activity tolerance.   Reports pain in L thigh that limits mobility progression but overall tolerated session well. Currently needing CGA for transfers and gait without an assistive device. Up/down 4 stairs with B rail and CGA with no overt difficulty but needs cues for technique. Anticipate patient will be safe to DC home with family support and [facility] PT follow up. Other factors to consider for discharge: at risk for falls, decreased functional mobility from baseline, pain limits mobility at times         PLAN :  Patient continues to benefit from skilled intervention to address the above impairments. Continue treatment per established plan of care. to address goals. Recommendation for discharge: (in order for the patient to meet his/her long term goals)  Physical therapy at least 2 days/week in the home       IF patient discharges home will need the following DME: patient owns DME required for discharge       SUBJECTIVE:   Patient stated I still have this pain in my thigh.     OBJECTIVE DATA SUMMARY:   Critical Behavior:  Neurologic State: Alert  Orientation Level: Oriented X4  Cognition: Appropriate safety awareness, Follows commands  Safety/Judgement: Awareness of environment, Good awareness of safety precautions, Home safety, Insight into deficits, Fall prevention    Spinal diagnosis intervention:  The patient stated 3/3 back precautions when prompted. Reviewed all 3 back precautions, log roll technique, and sitting for 30 minutes at a time. The patient required no cues to maintain back precautions during functional activity. Reviewed back brace application and wear schedule. Brace donned with supervision/set-up and no difficulty noted    Functional Mobility Training:    Bed Mobility:  Log roll with supervision and cues       Sit to Supine: Contact guard assistance; Additional time;Assist x1(cues for technique)  Scooting: Stand-by assistance        Transfers:  Sit to Stand: Contact guard assistance  Stand to Sit: Contact guard assistance                             Balance:  Sitting: Intact  Standing: Intact; With support  Ambulation/Gait Training:  Distance (ft): 125 Feet (ft)  Assistive Device: Brace/Splint;Gait belt  Ambulation - Level of Assistance: Contact guard assistance        Gait Abnormalities: Decreased step clearance        Base of Support: Widened     Speed/Renetta: Pace decreased (<100 feet/min); Slow  Step Length: Left shortened;Right shortened       Stairs:  Number of Stairs Trained: 4  Stairs - Level of Assistance: Contact guard assistance   Rail Use: Both      Pain Rating:  Reports pain with mobility but does not rate    Activity Tolerance:   Fair and requires rest breaks    After treatment patient left in no apparent distress:   Supine in bed, Call bell within reach, and Side rails x 3    COMMUNICATION/COLLABORATION:   The patients plan of care was discussed with: Physical therapist, Occupational therapist, and Registered nurse.      Todd Stanton PT, DPT   Time Calculation: 23 mins

## 2020-11-12 NOTE — PROGRESS NOTES
Problem: Self Care Deficits Care Plan (Adult)  Goal: *Acute Goals and Plan of Care (Insert Text)  Description: FUNCTIONAL STATUS PRIOR TO ADMISSION: Patient was independent and active without use of DME.    HOME SUPPORT: The patient lived alone. Reports daughter will be staying with her for several weeks - a month to assist as needed, however daughter will not be available 24/7. Reports having supportive neighbors who will be helping with cooking. Occupational Therapy Goals  Initiated 11/11/2020  1. Patient will perform lower body dressing with modified independence using AE prn within 7 days. 2.  Patient will perform toilet transfer with modified independence within 7 days. 3.  Patient will perform all aspects of toileting with modified independence within 7 days. 4.  Patient will don/doff back brace at modified independence within 7 days. 5.  Patient will verbalize/demonstrate 3/3 back precautions during ADL tasks without cues within 7 days. Outcome: Progressing Towards Goal     OCCUPATIONAL THERAPY TREATMENT  Patient: Fatuma Escamilla (36 y.o. female)  Date: 11/12/2020  Diagnosis: Spinal stenosis, lumbar [M48.061]   <principal problem not specified>  Procedure(s) (LRB):  L4-S1 REVISION LAMINECTOMY, L4-S1 FUSION  (N/A) 2 Days Post-Op  Precautions: Back(Brace)  Chart, occupational therapy assessment, plan of care, and goals were reviewed. ASSESSMENT  Patient continues with skilled OT services and is progressing towards goals. Patient received in bedside chair, agreeable to session. This date, emphasis on use of adaptive equipment for maximal independence and safety with toileting and lower body dressing. Patient doffing/donning socks with SBA, additional time, and cues for novel technique. Tried both dressing stick and reacher with reacher seeming less cumbersome.   Patient donning socks using sock aid with min assist for correct set-up, however up to mod physical assist needed to don shoes with elastic laces. Of note, patient with increased difficulty dressing LLE 2/2 continued L thigh pain with movement - discussed alternative techniques/modifications possible but deferred practice at this time 2/2 pain. Patient provided with post-op handouts as reference for energy conservation and ADL participation after spinal surgery. Current Level of Function Impacting Discharge (ADLs): up to mod A to don shoes with elastic laces; transfers with CGA    Other factors to consider for discharge: daughter will be staying with her at discharge         PLAN :  Patient continues to benefit from skilled intervention to address the above impairments. Continue treatment per established plan of care. to address goals. Recommend with staff: OOB to chair and toileting in bathroom with CGA and RW    Recommendation for discharge: (in order for the patient to meet his/her long term goals)  No skilled occupational therapy/ follow up rehabilitation needs identified at this time. This discharge recommendation:  Has been made in collaboration with the attending provider and/or case management    IF patient discharges home will need the following DME: patient owns DME required for discharge       SUBJECTIVE:   Patient stated Omid Guo made this thing was pretty smart\" in reference to sock aid. OBJECTIVE DATA SUMMARY:   Cognitive/Behavioral Status:  Neurologic State: Alert; Appropriate for age  Orientation Level: Oriented X4  Cognition: Follows commands; Appropriate decision making; Appropriate for age attention/concentration  Perception: Appears intact  Perseveration: No perseveration noted  Safety/Judgement: Insight into deficits; Fall prevention;Home safety    Functional Mobility and Transfers for ADLs:  Transfers:  Sit to Stand: Contact guard assistance   Stand to Sit: Contact guard assistance     Balance:  Sitting: Intact  Standing: Intact; With support    ADL Intervention:  Lower Body Dressing Assistance  Dressing Assistance: Minimum assistance; Moderate assistance  Socks: Minimum assistance; Compensatory technique training  Shoes with Elastic Laces: Moderate assistance; Compensatory technique training  Leg Crossed Method Used: No  Position Performed: Seated in chair  Cues: Verbal cues provided;Visual cues provided;Physical assistance  Adaptive Equipment Used: Dressing stick; Long handled shoe horn;Reacher;Sock aid; Walker    Cognitive Retraining  Problem Solving: General alternative solution  Safety/Judgement: Insight into deficits; Fall prevention;Home safety    The patient recalled and demonstrated 3/3 back precautions. Reviewed all 3 with patient. Dressing lower body: Patient instructed to don brace first and on the benefits to remain seated to don all clothing to increase independence with precautions and pain management. Toileting: Patient instructed on the benefits of using flushable wet wipes and toilet tongs if decreased reach or pain for sean care. Also, the benefits of a reacher to aid in clothing management. Standing: Patient instructed and indicated understanding to walk up to sink/counter top/surfaces, step into walker, square off while using objects, slide objects along surfaces, to increase adherence to back precautions and fall prevention. Patient instructed to increase amount of time standing in order to increase independence and tolerance with ADLs. During prolonged standing, can open cabinet door or place foot on stool to decrease spinal pressure/increase pain. Patient instructed and indicated understanding the benefits of maintaining activity tolerance, functional mobility, and independence with self care tasks during acute stay  to ensure safe return home and to baseline.  Encouraged patient to increase frequency and duration OOB, not sitting longer than 30 mins without marching/walking with staff, be out of bed for all meals, perform daily ADLs (as approved by RN/MD regarding bathing etc), and performing functional mobility to/from bathroom. Patient instruction and indicated understanding on body mechanics, ergonomics and gravitational force on the spine during different body positions to plan activities in prep for return home to complete instrumental ADLs and back to work safely. Pain:  Patient reporting significant L thigh pain - received meds prior to session. Activity Tolerance:   Fair and requires rest breaks    After treatment patient left in no apparent distress:   Sitting in chair, Call bell within reach, and PT in room    COMMUNICATION/COLLABORATION:   The patients plan of care was discussed with: Physical therapist and Registered nurse.      Ander Ruiz OT  Time Calculation: 25 mins

## 2020-11-12 NOTE — OP NOTES
[address]   OPERATIVE REPORT    Name:  Mayuri Garcia  MR#:  [id number]  :  1950  ACCOUNT #:  [de-identified]  DATE OF SERVICE:  11/10/2020      PREOPERATIVE DIAGNOSES:  Status post L4-5 fusion, L5-S1 foraminal stenosis and lumbar stenosis, and grade I  spondylolisthesis. POSTOPERATIVE DIAGNOSIS:  Status post L4-5 fusion, L5-S1 foraminal stenosis and lumbar stenosis, and grade I  spondylolisthesis. PROCEDURES PERFORMED:  Exploration of fusion; revision laminectomy, L5-S1; revision fusion, L4 to S1; posterior lumbar interbody fusion, L5-S1. SURGEON:  Jessi Fortune MD    ASSISTANT:  Deborah Campuzano PA-C    ANESTHESIA:  General anesthesia. COMPLICATIONS:  None. SPECIMENS REMOVED:  No specimen. IMPLANTS:  Instrumentation includes Medtronic Solera pedicle screws and Medtronic Elevate interbody grafts. ESTIMATED BLOOD LOSS:  Minimal.    DRAIN:  One medium Hemovac placed. INDICATIONS:  This is a pleasant 68-year-old female with a history of ongoing lower back pain and bilateral leg pain. History of previous L4-5 fusion many years ago, now with grade I spondylolisthesis at L5-S1 with severe foraminal stenosis due to anterolisthesis and facet hypertrophy. She is for revision laminectomy and extension of her fusion to S1. The patient understood the risks and benefits and elected to proceed. PROCEDURE:  The patient was brought to operative suite, underwent general anesthesia without difficulty. Ball catheter placed. Preoperative neuromonitoring was placed, baselines obtained and these remained stable throughout the surgical procedure. Ancef 2 g given to the patient. She was placed in the prone position on the open Cam table with all bony prominences well padded. Back was prepped and draped sterilely. After prepping and draping, time-out was performed. Previous surgical incision was opened, carrying out dissection.   We exposed the instrumentation from L4 to L5, and this showed a stable fusion in the posterior facet at L4-5 bilaterally. Better fusion on the right-hand side. We exposed the remainder of the transverse process at L4-L5 as well as the sacral ala, and the hypertrophic L5-S1 facet. Deep retractors were placed. The previous instrumentation was removed without difficulty and replaced with Medtronic Solera screws of the same size. We then started a revision laminectomy, removing the residual L5 lamina as well as the superior portion of the sacrum to expose the exiting L5 nerve root as well as the S1 nerve root and the final decompression, exposing the disk space as well. After that, we did bilateral annulotomies followed by complete diskectomy at the L5-S1 disk space, which we carefully distracted the disk space and released the disk space for reduction maneuver. We were able to get trial spacers into approximately 11 mm of height. We then, using standard bony landmarks, cannulated the pedicles. We used the Midas bur to start  holes followed by gearshift, which was tested out to 20 milliamps, followed by placement of Medtronic Solera screws, 7.5 x 40 mm at S1. Good fixation was obtained. We then packed a medium OsteoAMP sponge in the anterior one-third portion of the disk space, followed by 2 Medtronic Elevate implants for the disk space with distraction to normal disk space height. We then decorticated the previous fusion mass as well as the sacral ala, and then packed local bone graft as well as allograft into the lateral gutters. 60-mm rods attached to the pedicle screws and set screws. Final tightening performed. Final x-rays demonstrated stable fixation. Prior to bone grafting, the wound had been irrigated with pulse lavage 3000 mL as well as a dilute Betadine solution. After final x-rays, medium Hemovac was placed. 0 Vicryl on the fascial layer, 2-0 Vicryl on the subcutaneous layer, and 3-0 Vicryl on the skin.     The physician assistant was present during the entire operative procedure and assisted in all critical elements of the surgery. No surgical assist or resident was available.      Edin Giang MD      [email or web address]  D:  11/11/2020 9:28  T:  11/11/2020 19:13  JOB #:  [id number]

## 2020-11-12 NOTE — PROGRESS NOTES
CLIFF-Home with home health  RUR-8% Low    CM uploaded updated home health orders to [facility] via Checkd.In. CM to monitor.      Keven Atkins MS

## 2020-11-13 LAB
ANION GAP SERPL CALC-SCNC: 5 MMOL/L (ref 5–15)
BUN SERPL-MCNC: 17 MG/DL (ref 6–20)
BUN/CREAT SERPL: 15 (ref 12–20)
CALCIUM SERPL-MCNC: 8.3 MG/DL (ref 8.5–10.1)
CHLORIDE SERPL-SCNC: 107 MMOL/L (ref 97–108)
CO2 SERPL-SCNC: 25 MMOL/L (ref 21–32)
CREAT SERPL-MCNC: 1.12 MG/DL (ref 0.55–1.02)
GLUCOSE SERPL-MCNC: 123 MG/DL (ref 65–100)
HGB BLD-MCNC: 10.3 G/DL (ref 11.5–16)
HGB BLD-MCNC: 9.1 G/DL (ref 11.5–16)
POTASSIUM SERPL-SCNC: 3.9 MMOL/L (ref 3.5–5.1)
SODIUM SERPL-SCNC: 137 MMOL/L (ref 136–145)

## 2020-11-13 PROCEDURE — 97535 SELF CARE MNGMENT TRAINING: CPT

## 2020-11-13 PROCEDURE — 85018 HEMOGLOBIN: CPT

## 2020-11-13 PROCEDURE — 65270000032 HC RM SEMIPRIVATE

## 2020-11-13 PROCEDURE — 36415 COLL VENOUS BLD VENIPUNCTURE: CPT

## 2020-11-13 PROCEDURE — 80048 BASIC METABOLIC PNL TOTAL CA: CPT

## 2020-11-13 PROCEDURE — 97116 GAIT TRAINING THERAPY: CPT

## 2020-11-13 PROCEDURE — 74011250637 HC RX REV CODE- 250/637: Performed by: PHYSICIAN ASSISTANT

## 2020-11-13 RX ORDER — OXYCODONE HYDROCHLORIDE 5 MG/1
5 TABLET ORAL
Status: DISCONTINUED | OUTPATIENT
Start: 2020-11-13 | End: 2020-11-14 | Stop reason: HOSPADM

## 2020-11-13 RX ORDER — OXYCODONE HYDROCHLORIDE 5 MG/1
5 TABLET ORAL
Status: DISCONTINUED | OUTPATIENT
Start: 2020-11-13 | End: 2020-11-13

## 2020-11-13 RX ORDER — TRAMADOL HYDROCHLORIDE 50 MG/1
50 TABLET ORAL
Status: DISCONTINUED | OUTPATIENT
Start: 2020-11-13 | End: 2020-11-14 | Stop reason: HOSPADM

## 2020-11-13 RX ADMIN — OMEPRAZOLE 40 MG: 40 CAPSULE, DELAYED RELEASE ORAL at 08:45

## 2020-11-13 RX ADMIN — OXYCODONE HYDROCHLORIDE 10 MG: 5 TABLET ORAL at 00:09

## 2020-11-13 RX ADMIN — DOCUSATE SODIUM 50MG AND SENNOSIDES 8.6MG 1 TABLET: 8.6; 5 TABLET, FILM COATED ORAL at 09:13

## 2020-11-13 RX ADMIN — OXYCODONE HYDROCHLORIDE 10 MG: 5 TABLET ORAL at 09:12

## 2020-11-13 RX ADMIN — VALSARTAN 80 MG: 80 TABLET ORAL at 09:14

## 2020-11-13 RX ADMIN — ATORVASTATIN CALCIUM 10 MG: 10 TABLET, FILM COATED ORAL at 09:14

## 2020-11-13 RX ADMIN — DOCUSATE SODIUM 50MG AND SENNOSIDES 8.6MG 1 TABLET: 8.6; 5 TABLET, FILM COATED ORAL at 17:17

## 2020-11-13 RX ADMIN — Medication 10 ML: at 06:33

## 2020-11-13 RX ADMIN — OXYCODONE HYDROCHLORIDE 10 MG: 5 TABLET ORAL at 13:31

## 2020-11-13 RX ADMIN — Medication 10 ML: at 13:32

## 2020-11-13 NOTE — PROGRESS NOTES
Transition of Care: home with home health/PT/OT; [facility] has accepted; info added to the AVS     Transport Plan: in car with daughter; Sona Negro, [phone]     RUR: 9%    CM noted discharge order    1107: CM delivered 2nd IM letter to patient and confirmed transport home with daughter in car    Medicare pt has received, reviewed, and signed 2nd IM letter informing them of their right to appeal the discharge. Signed copied has been placed on pt bedside chart.     CM following  Soco Emanuel RN, CRM

## 2020-11-13 NOTE — PROGRESS NOTES
Orthopedic Spine Progress Note  Post Op day: 3 Days Post-Op    2020 10:51 AM   Admit Date: 11/10/2020  Procedure: Procedure(s):  L4-S1 REVISION LAMINECTOMY, L4-S1 FUSION     Subjective:     Lamberto Dalal has no complaints. Pain appears well managed. Joyce dressing in place. Tolerating diet. No N/V. Pain Control:   Pain Assessment  Pain Scale 1: Numeric (0 - 10)  Pain Intensity 1: 0  Pain Onset 1: post op  Pain Location 1: Back  Pain Orientation 1: Lower  Pain Description 1: Aching  Pain Intervention(s) 1: Medication (see MAR)    Objective:          Physical Exam:  General:  Alert and oriented. No acute distress. Heart:  Respirations unlabored. Abdomen:   Extremities: Soft, non-tender. No evidence of cyanosis. Pulses palpable in both upper and lower extremities. Neurologic:  Musculoskeletal:  No new motor deficits. Neurovascular exam within normal limits. Sensation stable. Motor: unchanged C5-T1 and L2-S1. Rivera's sign negative in bilateral lower extremities. Calves soft, nontender upon palpation and with passive twitch. Moves both upper and lower extremities. Incision: clean, dry, and intact. No significant erythema or swelling. No active drainage noted. Vital Signs:    Blood pressure 129/65, pulse 100, temperature 98.7 °F (37.1 °C), resp. rate 18, SpO2 96 %. Temp (24hrs), Av.5 °F (36.9 °C), Min:98.4 °F (36.9 °C), Max:98.7 °F (37.1 °C)      LAB:    Recent Labs     20  0304   HGB 10.3*     Lab Results   Component Value Date/Time    Sodium 139 2020 03:38 AM    Potassium 5.0 2020 03:38 AM    Chloride 109 (H) 2020 03:38 AM    CO2 24 2020 03:38 AM    Glucose 127 (H) 2020 03:38 AM    BUN 18 2020 03:38 AM    Creatinine 0.96 2020 03:38 AM    Calcium 8.2 (L) 2020 03:38 AM       Intake/Output:No intake/output data recorded. No intake/output data recorded.     PT/OT:   Gait:  Gait  Base of Support: Widened  Speed/Renetta: Pace decreased (<100 feet/min), Slow  Step Length: Left shortened, Right shortened  Gait Abnormalities: Decreased step clearance  Ambulation - Level of Assistance: Contact guard assistance  Distance (ft): 125 Feet (ft)  Assistive Device: Brace/Splint, Gait belt  Rail Use: Both  Stairs - Level of Assistance: Contact guard assistance  Number of Stairs Trained: 4                 Assessment:   Patient is 3 Days Post-Op s/p Procedure(s):  L4-S1 REVISION LAMINECTOMY, L4-S1 FUSION     Plan:     1. Continue PT/OT  2. Continue established methods of pain control  3. VTE Prophylaxes - TEDS &/or SCDs   4. Encouraged use of ICS. 5.  Joyce dressing to remain in place for 7 days. 6.  D/c to home with [facility] today.       Signed By: KIM Peng

## 2020-11-13 NOTE — PROGRESS NOTES
Patient is awaiting discharge - when I presented to her room to answer some questions that she had I noticed she seemed confused. Patient is adamant that she only had PT once yesterday; this is not true. She also notes she cannot remember what she had for breakfast. She does not remember much of her conversation with Alisia Rutledge this morning. She states she is having a hard time remembering quite a few things in fact and she is not confident in her answers to my questions. She is alert and oriented to time, place, and person. She is able to tell me who the president is. She continued to tell me the same story about leaving her phone somewhere 3 times. It is my gut feeling that she is overmedicated on oxycodone 10mg. I ordered some basic labs to be sure there is not something else going on. We will wait to see how she does over the next few hours as oxycodone wears off. I called Dr. Shen ALVAREZ, [facility] and made her aware and asked for their recomendation. Waiting to hear back from Dr. Shen Rooney team, I appreciate their input.

## 2020-11-13 NOTE — PROGRESS NOTES
Problem: Self Care Deficits Care Plan (Adult)  Goal: *Acute Goals and Plan of Care (Insert Text)  Description: FUNCTIONAL STATUS PRIOR TO ADMISSION: Patient was independent and active without use of DME.    HOME SUPPORT: The patient lived alone. Reports daughter will be staying with her for several weeks - a month to assist as needed, however daughter will not be available 24/7. Reports having supportive neighbors who will be helping with cooking. Occupational Therapy Goals  Initiated 11/11/2020  1. Patient will perform lower body dressing with modified independence using AE prn within 7 days. 2.  Patient will perform toilet transfer with modified independence within 7 days. 3.  Patient will perform all aspects of toileting with modified independence within 7 days. 4.  Patient will don/doff back brace at modified independence within 7 days. 5.  Patient will verbalize/demonstrate 3/3 back precautions during ADL tasks without cues within 7 days. Outcome: Progressing Towards Goal     OCCUPATIONAL THERAPY TREATMENT  Patient: Feli Blackmon (57 y.o. female)  Date: 11/13/2020  Diagnosis: Spinal stenosis, lumbar [M48.061]   <principal problem not specified>  Procedure(s) (LRB):  L4-S1 REVISION LAMINECTOMY, L4-S1 FUSION  (N/A) 3 Days Post-Op  Precautions: Back(Brace)  Chart, occupational therapy assessment, plan of care, and goals were reviewed. ASSESSMENT  Patient continues with skilled OT services and is progressing towards goals. Patient received with PT, reporting need for BM. Patient completing bathroom mobility and toilet transfer with CGA, increased time, and mod cues for hand placement. Patient with improved ability to complete BM hygiene in seated within spinal precautions - although she requested OT check for effectiveness, patient successfully completed task with SBA. Patient continues to benefit from occasional cues for best technique and safety.     Current Level of Function Impacting Discharge (ADLs): CGA-min assist    Other factors to consider for discharge: daughter will be staying with her         PLAN :  Patient continues to benefit from skilled intervention to address the above impairments. Continue treatment per established plan of care. to address goals. Recommendation for discharge: (in order for the patient to meet his/her long term goals)  If patient continues to experience intermittent confusion, may benefit from [place] to facilitate carryover. This discharge recommendation:  Has not yet been discussed the attending provider and/or case management    IF patient discharges home will need the following DME: patient owns DME required for discharge       SUBJECTIVE:   Patient stated did I get it?  in reference to effectiveness of BM hygiene. OBJECTIVE DATA SUMMARY:   Cognitive/Behavioral Status:  Neurologic State: Alert; Appropriate for age  Orientation Level: Oriented X4  Cognition: Follows commands  Perception: Appears intact  Perseveration: No perseveration noted  Safety/Judgement: Home safety;Decreased awareness of environment    Functional Mobility and Transfers for ADLs:  Transfers:  Sit to Stand: Contact guard assistance; Additional time  Functional Transfers  Bathroom Mobility: Contact guard assistance  Toilet Transfer : Contact guard assistance; Additional time  Cues: Verbal cues provided;Visual cues provided     Balance:  Sitting: Intact  Standing  Static Standing: Good; Fair  Dynamic Standing: Fair    ADL Intervention:  Toileting  Toileting Assistance: Minimum assistance  Bowel Hygiene: Stand-by assistance  Clothing Management: Minimum assistance  Cues: Verbal cues provided;Visual cues provided  Adaptive Equipment: Grab bars    Cognitive Retraining  Safety/Judgement: Home safety;Decreased awareness of environment    The patient recalled and demonstrated 2/3 back precautions. Reviewed all 3 with patient.     Home safety: Patient instructed and indicated understanding on home modifications and safety (raise height of ADL objects, appropriate height of chair surfaces, recliner safety, change of floor surfaces, clear pathways) to increase independence and fall prevention with CGA. Standing: Patient instructed and indicated understanding to walk up to sink/counter top/surfaces, step into walker, square off while using objects, slide objects along surfaces, to increase adherence to back precautions and fall prevention. Patient instructed to increase amount of time standing in order to increase independence and tolerance with ADLs. During prolonged standing, can open cabinet door or place foot on stool to decrease spinal pressure/increase pain. Patient instructed and indicated understanding the benefits of maintaining activity tolerance, functional mobility, and independence with self care tasks during acute stay  to ensure safe return home and to baseline. Encouraged patient to increase frequency and duration OOB, not sitting longer than 30 mins without marching/walking with staff, be out of bed for all meals, perform daily ADLs (as approved by RN/MD regarding bathing etc), and performing functional mobility to/from bathroom. Patient instruction and indicated understanding on body mechanics, ergonomics and gravitational force on the spine during different body positions to plan activities in prep for return home to complete instrumental ADLs and back to work safely. Pain:  Continues to report significant post-op pain. Activity Tolerance:   Fair and requires rest breaks    After treatment patient left in no apparent distress:   Sitting in chair and Call bell within reach    COMMUNICATION/COLLABORATION:   The patients plan of care was discussed with: Physical therapist and Registered nurse.      Nita Hicks OT  Time Calculation: 13 mins

## 2020-11-14 VITALS
TEMPERATURE: 98.2 F | RESPIRATION RATE: 18 BRPM | DIASTOLIC BLOOD PRESSURE: 65 MMHG | HEART RATE: 88 BPM | OXYGEN SATURATION: 93 % | SYSTOLIC BLOOD PRESSURE: 133 MMHG

## 2020-11-14 PROCEDURE — 74011250637 HC RX REV CODE- 250/637: Performed by: PHYSICIAN ASSISTANT

## 2020-11-14 PROCEDURE — 97535 SELF CARE MNGMENT TRAINING: CPT

## 2020-11-14 PROCEDURE — 97116 GAIT TRAINING THERAPY: CPT

## 2020-11-14 RX ADMIN — OXYCODONE HYDROCHLORIDE 5 MG: 5 TABLET ORAL at 13:53

## 2020-11-14 RX ADMIN — OXYCODONE HYDROCHLORIDE 5 MG: 5 TABLET ORAL at 09:33

## 2020-11-14 RX ADMIN — VALSARTAN 80 MG: 80 TABLET ORAL at 09:24

## 2020-11-14 RX ADMIN — OMEPRAZOLE 40 MG: 40 CAPSULE, DELAYED RELEASE ORAL at 07:09

## 2020-11-14 RX ADMIN — ATORVASTATIN CALCIUM 10 MG: 10 TABLET, FILM COATED ORAL at 09:23

## 2020-11-14 RX ADMIN — DOCUSATE SODIUM 50MG AND SENNOSIDES 8.6MG 1 TABLET: 8.6; 5 TABLET, FILM COATED ORAL at 09:21

## 2020-11-14 RX ADMIN — POLYETHYLENE GLYCOL 3350 17 G: 17 POWDER, FOR SOLUTION ORAL at 09:21

## 2020-11-14 NOTE — PROGRESS NOTES
Patient discharged to home. Patient alert and oriented x 4. Vital signs remain stable and patient is afebrile. Discharge instructions and prescriptions discussed with patient. Patient verbalizes understanding. Time allotted for questions. Patient and belongings transported to [facility] via wheel chair. PIV removed.  Discharge instructions given to patient

## 2020-11-14 NOTE — PROGRESS NOTES
Problem: Self Care Deficits Care Plan (Adult)  Goal: *Acute Goals and Plan of Care (Insert Text)  Description: FUNCTIONAL STATUS PRIOR TO ADMISSION: Patient was independent and active without use of DME.    HOME SUPPORT: The patient lived alone. Reports daughter will be staying with her for several weeks - a month to assist as needed, however daughter will not be available 24/7. Reports having supportive neighbors who will be helping with cooking. Occupational Therapy Goals  Initiated 11/11/2020  1. Patient will perform lower body dressing with modified independence using AE prn within 7 days. 2.  Patient will perform toilet transfer with modified independence within 7 days. 3.  Patient will perform all aspects of toileting with modified independence within 7 days. 4.  Patient will don/doff back brace at modified independence within 7 days. 5.  Patient will verbalize/demonstrate 3/3 back precautions during ADL tasks without cues within 7 days. Outcome: Progressing Towards Goal   OCCUPATIONAL THERAPY TREATMENT  Patient: Zev Lopez (19 y.o. female)  Date: 11/14/2020  Diagnosis: Spinal stenosis, lumbar [M48.061]   <principal problem not specified>  Procedure(s) (LRB):  L4-S1 REVISION LAMINECTOMY, L4-S1 FUSION  (N/A) 4 Days Post-Op  Precautions: Back(Brace)  Chart, occupational therapy assessment, plan of care, and goals were reviewed. ASSESSMENT  Patient continues with skilled OT services and is progressing towards goals. Patient progressing well and completing self care for dressing, toileting, and washing hands at sink with standard walker, adapted equipment (including training this date), verbal cues. Patient reports she has a BSC, shower chair, and hip kit. Donned/doffed corset brace without assist. Recalls 3/3 back precautions and applies to session with min cues. Patient cleared by OT for discharge when medically cleared.      Current Level of Function Impacting Discharge (ADLs): Set up/supervision to stand by assist and min cues for self care and functional transfers    Other factors to consider for discharge: states daughter will be with her most of the time, but will need to leave occasionally for appointments         PLAN :  Patient continues to benefit from skilled intervention to address the above impairments. Continue treatment per established plan of care. to address goals. Recommend with staff: Jenny Kramer in chair for meals and self care, Set up/supervision to stand by assist and min cues for self care and functional transfers, toileting in bathroom with supervision of 1    Recommend next OT session: ADLs without cues    Recommendation for discharge: (in order for the patient to meet his/her long term goals)  No skilled occupational therapy/ follow up rehabilitation needs identified at this time. This discharge recommendation:  Has been made in collaboration with the attending provider and/or case management    IF patient discharges home will need the following DME: none       SUBJECTIVE:   Patient stated My daughter will be there most of the time.     OBJECTIVE DATA SUMMARY:   Cognitive/Behavioral Status:  Neurologic State: Alert  Orientation Level: Oriented X4  Cognition: Appropriate for age attention/concentration; Appropriate safety awareness; Follows commands  Perception: Appears intact  Perseveration: No perseveration noted  Safety/Judgement: Awareness of environment    Functional Mobility and Transfers for ADLs:  Bed Mobility:  Rolling: Modified independent  Supine to Sit: Stand-by assistance  Sit to Supine: Supervision;Assist x1;Additional time  Scooting: Supervision    Transfers:  Sit to Stand: Supervision;Assist x1;Additional time; Adaptive equipment  Functional Transfers  Toilet Transfer : Supervision;Assist x1;Additional time; Adaptive equipment  Cues: Verbal cues provided  Adaptive Equipment: Cheryl Bradford (comment);Grab bars; Other (comment)(elevated seat)       Balance:  Sitting: Intact  Standing: Intact; With support    ADL Intervention:       Grooming  Position Performed: Standing  Washing Hands: Supervision       Lower Body Dressing Assistance  Underpants: Supervision  Pants With Elastic Waist: Supervision  Slip on Shoes with Back: Supervision  Leg Crossed Method Used: No  Position Performed: Seated in chair;Standing  Cues: Verbal cues provided  Adaptive Equipment Used: Walker;Dressing stick; Reacher; Other(comment); Long handled shoe horn(states she is familair with use of sock aide)    Toileting  Bladder Hygiene: Contact guard assistance  Clothing Management: Stand-by assistance  Cues: Physical assistance for pants down;Physical assistance for pants up;Verbal cues provided  Adaptive Equipment: Grab bars; Walker    Cognitive Retraining  Safety/Judgement: Awareness of environment    The patient recalled and demonstrated 3/3 back precautions. Reviewed all 3 with patient. Dressing brace: Patient instructed and demonstrated to don/doff velcro on brace using dominant side, keeping non-dominant side intact. Dressing lower body: Patient instructed to don brace first and on the benefits to remain seated to don all clothing to increase independence with precautions and pain management. Toileting: Patient instructed on the benefits of using flushable wet wipes  decreased reach or pain for sean care. Also, the benefits of a reacher to aid in clothing management. Home safety: Patient instructed and indicated understanding on home modifications and safety (raise height of ADL objects, appropriate height of chair surfaces, recliner safety, change of floor surfaces, clear pathways) to increase independence and fall prevention with walker for self care. Standing: Patient instructed and indicated understanding to walk up to sink/counter top/surfaces, step into walker, square off while using objects, slide objects along surfaces, to increase adherence to back precautions and fall prevention. Patient instructed to increase amount of time standing in order to increase independence and tolerance with ADLs. During prolonged standing, can open cabinet door or place foot on stool to decrease spinal pressure/increase pain. Patient instructed and indicated understanding the benefits of maintaining activity tolerance, functional mobility, and independence with self care tasks during acute stay  to ensure safe return home and to baseline. Encouraged patient to increase frequency and duration OOB, not sitting longer than 30 mins without marching/walking with staff, be out of bed for all meals, perform daily ADLs (as approved by RN/MD regarding bathing etc), and performing functional mobility to/from bathroom. Patient instruction and indicated understanding on body mechanics, ergonomics and gravitational force on the spine during different body positions to plan activities in prep for return home to complete instrumental ADLs and back to work safely. Activity Tolerance:   Fair    After treatment patient left in no apparent distress:   Supine in bed, Call bell within reach, Caregiver / family present, and Side rails x 3    COMMUNICATION/COLLABORATION:   The patients plan of care was discussed with: Physical therapist and Registered nurse.      Raynold Siemens, COTA  Time Calculation: 49 mins

## 2020-11-14 NOTE — PROGRESS NOTES
Orthopedic Spine Progress Note  Post Op day: 4 Days Post-Op    2020 10:51 AM   Admit Date: 11/10/2020  Procedure: Procedure(s):  L4-S1 REVISION LAMINECTOMY, L4-S1 FUSION     Subjective:     Adolph Gray has no complaints. Pain appears well managed. Joyce dressing in place. Tolerating diet. No N/V. Pain Control:   Pain Assessment  Pain Scale 1: Numeric (0 - 10)  Pain Intensity 1: 5  Pain Onset 1: postop  Pain Location 1: Back  Pain Orientation 1: Lower  Pain Description 1: Aching  Pain Intervention(s) 1: Medication (see MAR)    Objective:          Physical Exam:  General:  Alert and oriented. No acute distress. Heart:  Respirations unlabored. Abdomen:   Extremities: Soft, non-tender. No evidence of cyanosis. Pulses palpable in both upper and lower extremities. Neurologic:  Musculoskeletal:  No new motor deficits. Neurovascular exam within normal limits. Sensation stable. Motor: unchanged C5-T1 and L2-S1. Rivera's sign negative in bilateral lower extremities. Calves soft, nontender upon palpation and with passive twitch. Moves both upper and lower extremities. Incision: clean, dry, and intact. No significant erythema or swelling. No active drainage noted. Vital Signs:    Blood pressure 122/65, pulse 92, temperature 98.6 °F (37 °C), resp. rate 18, SpO2 90 %. Temp (24hrs), Av.5 °F (36.9 °C), Min:98.2 °F (36.8 °C), Max:98.7 °F (37.1 °C)      LAB:    Recent Labs     20  1511   HGB 9.1*     Lab Results   Component Value Date/Time    Sodium 137 2020 03:11 PM    Potassium 3.9 2020 03:11 PM    Chloride 107 2020 03:11 PM    CO2 25 2020 03:11 PM    Glucose 123 (H) 2020 03:11 PM    BUN 17 2020 03:11 PM    Creatinine 1.12 (H) 2020 03:11 PM    Calcium 8.3 (L) 2020 03:11 PM       Intake/Output:No intake/output data recorded. No intake/output data recorded.     PT/OT:   Gait:  Gait  Base of Support: Widened  Speed/Renetta: Slow  Step Length: Right shortened, Left shortened  Gait Abnormalities: Decreased step clearance  Ambulation - Level of Assistance: Contact guard assistance  Distance (ft): 100 Feet (ft)  Assistive Device: Brace/Splint, Gait belt  Rail Use: Both  Stairs - Level of Assistance: Contact guard assistance  Number of Stairs Trained: 4                 Assessment:   Patient is 4 Days Post-Op s/p Procedure(s):  L4-S1 REVISION LAMINECTOMY, L4-S1 FUSION     Plan:     1. Continue PT/OT  2. Continue established methods of pain control  3. VTE Prophylaxes - TEDS &/or SCDs   4. Encouraged use of ICS. 5.  Joyce dressing to remain in place for 7 days. 6.  D/c to home with [facility] when passes PT.       Signed By: Anila Champagne MD

## 2020-11-14 NOTE — PROGRESS NOTES
Problem: Falls - Risk of  Goal: *Absence of Falls  Description: Document Deven Levy Fall Risk and appropriate interventions in the flowsheet.   Outcome: Progressing Towards Goal  Note: Fall Risk Interventions:  Mobility Interventions: Communicate number of staff needed for ambulation/transfer    Mentation Interventions: Door open when patient unattended    Medication Interventions: Evaluate medications/consider consulting pharmacy, Patient to call before getting OOB, Teach patient to arise slowly    Elimination Interventions: Call light in reach, Patient to call for help with toileting needs              Problem: Patient Education: Go to Patient Education Activity  Goal: Patient/Family Education  Outcome: Progressing Towards Goal

## 2020-11-14 NOTE — PROGRESS NOTES
Problem: Mobility Impaired (Adult and Pediatric)  Goal: *Acute Goals and Plan of Care (Insert Text)  Description: FUNCTIONAL STATUS PRIOR TO ADMISSION: Patient was independent and active without use of DME.    HOME SUPPORT PRIOR TO ADMISSION: The patient lived alone with no local support. She will have her daughter to assist her some while recovering. Physical Therapy Goals  Initiated 11/11/2020    1. Patient will move from supine to sit and sit to supine  in bed with independence within 4 days. 2. Patient will perform sit to stand with modified independence within 4 days. 3. Patient will ambulate with independence for 200 feet with the least restrictive device within 4 days. 4. Patient will ascend/descend 3 stairs with 1 handrail(s) with modified independence within 4 days. 5. Patient will verbalize and demonstrate understanding of spinal precautions (No bending, lifting greater than 5 lbs, or twisting; log-roll technique; frequent repositioning as instructed) within 4 days. Outcome: Progressing Towards Goal  PHYSICAL THERAPY NOTE  Patient: Aparna Diaz (89 y.o. female)  Date: 11/14/2020  Primary Diagnosis: Spinal stenosis, lumbar [M48.061]  Procedure(s) (LRB):  L4-S1 REVISION LAMINECTOMY, L4-S1 FUSION  (N/A) 4 Days Post-Op   Precautions:  Back(Brace)    Aparna Diaz was seen for morning PT session. She is walking 100 feet with CGA and with the standard walker for additional support d/t increased pain from spasms she is experiencing. Patient was instructed in stair management and able to negotiate 3 steps this session using both rails with CGA. Reviewed activity recommendations and restrictions with patient. Aparna Diaz is clear for discharge home from a mobility standpoint once medically cleared. RN is aware.      Morning session functional mobility:  Bed Mobility:  Rolling: Modified independent  Supine to Sit: Stand-by assistance  Sit to Supine: Supervision;Assist x1;Additional time  Scooting: Supervision  Transfers:  Sit to Stand: Supervision;Assist x1;Additional time; Adaptive equipment  Stand to Sit: Supervision;Assist x1;Additional time; Adaptive equipment                       Balance:   Sitting: Intact  Standing: Intact; With support  Ambulation/Gait Training:  Distance (ft): 100 Feet (ft)  Assistive Device: Brace/Splint;Gait belt;Walker  Ambulation - Level of Assistance: Contact guard assistance;Assist x1        Gait Abnormalities: Decreased step clearance(antalgic w/ when spasms occured)        Base of Support: Widened     Speed/Renetta: Pace decreased (<100 feet/min); Slow  Step Length: Left shortened;Right shortened                 Stairs:  Number of Stairs Trained: 3  Stairs - Level of Assistance: Contact guard assistance;Assist X1  Rail Use: Both    Thank you,  Kindra Dorado PTA   Time Calculation: 19 mins

## 2020-11-14 NOTE — PROGRESS NOTES
Assumed care of patient. SBAR report received from off going nurse. Patient alert and oriented x 4. Patient offers no concerns and is able to verbalized needs. Patient denies pain. Patient in chair. Breathing appears even and unlabored. Assessment to follow. White board updated. Call bell within reach. Will continue to monitor.

## 2020-11-17 NOTE — DISCHARGE SUMMARY
Procedure(s):  L4-S1 REVISION LAMINECTOMY, L4-S1 FUSION  Operative Report      Date of Surgery: 11/10/2020     Preoperative Diagnosis:  SPONDYLOLISTHESIS, STENOSIS    Postoperative Diagnosis: SPONDYLOLISTHESIS, STENOSIS    Procedure: Procedure(s):  L4-S1 REVISION LAMINECTOMY, L4-S1 FUSION      Surgeon: Prudencio Cramer. Horace Araya MD    History and Hospital Course:  Patsy Gustafson is a pleasant 65y.o. year old female who has complaints of low back pain and bilateral leg symtoms . Diagnostic testing found status post L4-5 fusion, L5-S1 foraminal stenosis and lumbar stenosis, and grade I  spondylolisthesis. Having failed conservative treatment, the patient elected to undergo operative intervention. She tolerated the procedure well and was admitted post-operatively for antibiotics and pain control. On post-op day 1, the patient was doing well. She had some complaints of lower back pain and some mild residual left lower extremity discomfort. She was started on a clear liquid diet. She was allowed to dangle on the edge of the bed with physical therapy. PCA was continued. IV antibiotics were continued. On post-op day 2, the patient continued to progress well. She was started on a regular diet. The PCA was discontinued and the patient was started on oral pain medications. She was allowed to started getting out of bed with physical therapy. Patient had increased wound drainage and a REE wound vac dressing was placed to remain for 7 days. On post op day 3, patient continued to work with physical therapy. Patient began to experience some confusion in the afternoon stating she did not remember much of the conversation she had with rounding PA in the morning, though remained oriented x3. Basic labs were ordered, and patient was monitored while pain medication was allowed to wear off. Patient's confusion improved as pain medication wore off over the next several hours.     On post-op day 4, the patient was deemed ready for discharge. She was discharged to home with home health. She was tolerating a regular diet and pain was well controlled with oral pain medications. The patient was discharged with prescriptions for pain medications. She was instructed to wear her brace at all times when out of bed. She was instructed to limit her bending, lifting and twisting. The patient will follow-up in 10-14 days for repeat x-rays and a wound check.       Signed By: KIM Tolliver

## 2020-11-19 NOTE — PROGRESS NOTES
Physician Progress Note      Amos Weathers  CSN #:                  [id number]  :                       1950  ADMIT DATE:       11/10/2020 11:16 AM  DISCH DATE:        2020 3:11 PM  RESPONDING  PROVIDER #:        Kemar ALVAREZ          QUERY TEXT:    Dear attending physician,    The patient presented for a spinal fusion. The progress note on  notes bloody drainage saturating bottom half of dressing. Extra H&H's were ordered, including 2 on the day of discharge. Pre op hemoglobin was 13.0. This decreased to 8.8. If possible, please document in the progress notes and discharge summary if you are evaluating and/or treating any of the following: The medical record reflects the following:  Risk Factors: s/p fusion on 11/10  Clinical Indicators: Preop hgb 13.0 on 10/14/20, 20-Hgb 10.2, -Hgb 8.8, -hgb 10.3, 9.1  -Per PN-Per nurses report, patient's wound was draining and saturated her dressing last night. She is 2 days s/p L4-S1 revision laminectomy and L4-S1 fusion. I made Dr. Fidelina Friedman and his team aware of this and they recommend a Ree wound vac dressing  Per dc summary-  Patient had increased wound drainage and a REE wound vac dressing was placed to remain for 7 days.   Treatment: Monitor labwork, vital signs, wound, REE dressing    Thank you,  Stan Miranda RN, BSN  Clinical documentation Improvement  [phone]  Options provided:  -- Acute blood loss anemia  -- Dilutional anemia  -- Drop in Hemoglobin and Hematocrit  -- Other - I will add my own diagnosis  -- Disagree - Not applicable / Not valid  -- Disagree - Clinically unable to determine / Unknown  -- Refer to Clinical Documentation Reviewer    PROVIDER RESPONSE TEXT:    Post-operative anemia    Query created by: Luisito Izquierdo on 2020 7:12 AM      Electronically signed by:  Kemar ALVAREZ 2020 7:54 AM

## 2021-04-09 ENCOUNTER — HOSPITAL ENCOUNTER (OUTPATIENT)
Dept: MAMMOGRAPHY | Age: 71
Discharge: HOME | End: 2021-04-09
Attending: FAMILY MEDICINE
Payer: MEDICARE

## 2021-04-09 DIAGNOSIS — Z12.31 VISIT FOR SCREENING MAMMOGRAM: ICD-10-CM

## 2021-04-09 PROCEDURE — 77067 SCR MAMMO BI INCL CAD: CPT

## 2021-10-23 VITALS — HEIGHT: 65 IN | BODY MASS INDEX: 31.65 KG/M2 | WEIGHT: 190 LBS

## 2021-10-23 PROBLEM — M17.0 PRIMARY OSTEOARTHRITIS OF BOTH KNEES: Status: ACTIVE | Noted: 2021-10-23

## 2021-10-23 PROBLEM — M43.10 SPONDYLOLISTHESIS, ACQUIRED: Status: ACTIVE | Noted: 2021-10-23

## 2021-10-23 PROBLEM — M51.36 DDD (DEGENERATIVE DISC DISEASE), LUMBAR: Status: ACTIVE | Noted: 2021-10-23

## 2021-10-23 PROBLEM — G62.9 PERIPHERAL NEUROPATHY: Status: ACTIVE | Noted: 2021-10-23

## 2021-10-23 PROBLEM — M40.202 CERVICAL KYPHOSIS: Status: ACTIVE | Noted: 2021-10-23

## 2021-10-23 PROBLEM — M50.30 DDD (DEGENERATIVE DISC DISEASE), CERVICAL: Status: ACTIVE | Noted: 2021-10-23

## 2021-10-23 PROBLEM — Z98.1 S/P LUMBAR FUSION: Status: ACTIVE | Noted: 2021-10-23

## 2021-10-23 PROBLEM — M54.2 CERVICALGIA: Status: ACTIVE | Noted: 2021-10-23

## 2021-10-23 PROBLEM — M51.36 LUMBAR ADJACENT SEGMENT DISEASE WITH SPONDYLOLISTHESIS: Status: ACTIVE | Noted: 2021-10-23

## 2021-10-23 PROBLEM — M43.16 LUMBAR ADJACENT SEGMENT DISEASE WITH SPONDYLOLISTHESIS: Status: ACTIVE | Noted: 2021-10-23

## 2021-10-23 PROBLEM — M48.061 DEGENERATIVE LUMBAR SPINAL STENOSIS: Status: ACTIVE | Noted: 2021-10-23

## 2021-10-23 PROBLEM — M54.50 LOW BACK PAIN: Status: ACTIVE | Noted: 2021-10-23

## 2021-10-23 RX ORDER — FLECAINIDE ACETATE 50 MG/1
TABLET ORAL 2 TIMES DAILY
COMMUNITY

## 2021-10-23 RX ORDER — LOSARTAN POTASSIUM 100 MG/1
100 TABLET ORAL DAILY
COMMUNITY

## 2021-10-23 RX ORDER — HYDROCHLOROTHIAZIDE 25 MG/1
25 TABLET ORAL DAILY
COMMUNITY

## 2021-10-23 RX ORDER — GABAPENTIN 300 MG/1
300 CAPSULE ORAL 3 TIMES DAILY
COMMUNITY

## 2021-10-23 RX ORDER — FAMOTIDINE 10 MG/1
10 TABLET ORAL 2 TIMES DAILY
COMMUNITY

## 2021-11-10 ENCOUNTER — OFFICE VISIT (OUTPATIENT)
Dept: ORTHOPEDIC SURGERY | Age: 71
End: 2021-11-10
Payer: MEDICARE

## 2021-11-10 VITALS — WEIGHT: 190 LBS | HEIGHT: 65 IN | BODY MASS INDEX: 31.65 KG/M2

## 2021-11-10 DIAGNOSIS — Z98.1 S/P LUMBAR FUSION: Primary | ICD-10-CM

## 2021-11-10 DIAGNOSIS — R10.2 PELVIC PAIN IN FEMALE: ICD-10-CM

## 2021-11-10 PROCEDURE — 1090F PRES/ABSN URINE INCON ASSESS: CPT | Performed by: PHYSICIAN ASSISTANT

## 2021-11-10 PROCEDURE — G8399 PT W/DXA RESULTS DOCUMENT: HCPCS | Performed by: PHYSICIAN ASSISTANT

## 2021-11-10 PROCEDURE — 1101F PT FALLS ASSESS-DOCD LE1/YR: CPT | Performed by: PHYSICIAN ASSISTANT

## 2021-11-10 PROCEDURE — G8432 DEP SCR NOT DOC, RNG: HCPCS | Performed by: PHYSICIAN ASSISTANT

## 2021-11-10 PROCEDURE — 99214 OFFICE O/P EST MOD 30 MIN: CPT | Performed by: PHYSICIAN ASSISTANT

## 2021-11-10 PROCEDURE — 3017F COLORECTAL CA SCREEN DOC REV: CPT | Performed by: PHYSICIAN ASSISTANT

## 2021-11-10 PROCEDURE — G9899 SCRN MAM PERF RSLTS DOC: HCPCS | Performed by: PHYSICIAN ASSISTANT

## 2021-11-10 PROCEDURE — G8536 NO DOC ELDER MAL SCRN: HCPCS | Performed by: PHYSICIAN ASSISTANT

## 2021-11-10 PROCEDURE — G8417 CALC BMI ABV UP PARAM F/U: HCPCS | Performed by: PHYSICIAN ASSISTANT

## 2021-11-10 PROCEDURE — G8427 DOCREV CUR MEDS BY ELIG CLIN: HCPCS | Performed by: PHYSICIAN ASSISTANT

## 2021-11-15 NOTE — PROGRESS NOTES
Sue Pisano (: 1950) is a 66 y.o. female patient here for evaluation of the following chief complaint(s):  Back Pain and Surgical Follow-up (L4/S1 Lumbar Fusion 11/10/20 (1 Year PO))         ASSESSMENT/PLAN:  Below is the assessment and plan developed based on review of pertinent history, physical exam, labs, studies, and medications. 1. S/P lumbar fusion  -     XR SPINE LUMB MIN 4 V; Future  -     CT SPINE LUMB WO CONT; Future  -     CT PELV WO CONT; Future  2. Pelvic pain in female   -     CT PELV WO CONT; Future      The patient's radiologic findings have been reviewed with her in detail today. She is approximately 1 year out from her recent posterior lumbar fusion. She does have continued low back pain with radiation into the left SI joint. She has had a previous bone graft from the left iliac region many years ago. She does have a known history of osteoporosis. She is recently been found to have bilateral femur stress reaction secondary to prolonged use of medication for osteoporosis treatment. She is concerned that she may be at risk for incomplete fusion or possible stress reaction in the pelvis. We have discussed various treatment options, and I think that she is a candidate for CT scan of both the lumbar spine and the pelvis. She may call back for results. Return for call back for CT results. SUBJECTIVE/OBJECTIVE:  Sue Pisano (: 1950) is a 66 y.o. female who presents today for the following:  Chief Complaint   Patient presents with    Back Pain    Surgical Follow-up     L4/S1 Lumbar Fusion 11/10/20 (1 Year PO)        Back Pain     Surgical Follow-up       Ms. Salvador Seen presents today for a follow-up visit. She is approximately 1 year out from her recent posterior lumbar fusion. She states that she continues with some low back pain with sensation of bilateral anterior thigh weakness. She was seen recently by her rheumatologist and had similar complaints.   She was concerned that her longstanding treatment for osteoporosis could be putting her at risk for femur fractures. She was found to have stress reactions in her bilateral mid femurs, and has been seen recently by Dr. Belgica Trevizo for this problem. She has point tenderness over her bilateral anterior thighs, and denies any radicular type leg pain. She feels that this may be related to her sensation of weakness in her thighs. She does continue to have discomfort in her low back as well as in the left iliac area at a previous site of a bone graft. No numbness, tingling in her legs. No changes in bowel or bladder control. IMAGING:  XR Results (most recent):  Results from Appointment encounter on 11/10/21    XR SPINE LUMB MIN 4 V    Narrative  AP and lateral films of the lumbar spine reveal a stable posterior lumbar fusion L4-S1 with stable instrumentation. Mild junctional stenosis noted above her fusion at L3-4. Allergies   Allergen Reactions    Penicillins Anaphylaxis    Codeine Itching     ANXIOUS AND NERVOUS      Sulfa (Sulfonamide Antibiotics) Hives       Current Outpatient Medications   Medication Sig    gabapentin (NEURONTIN) 300 mg capsule Take 300 mg by mouth three (3) times daily.  losartan (COZAAR) 100 mg tablet Take 100 mg by mouth daily.  famotidine (PEPCID) 10 mg tablet Take 10 mg by mouth two (2) times a day.  flecainide (TAMBOCOR) 50 mg tablet Take  by mouth two (2) times a day.  hydroCHLOROthiazide (HYDRODIURIL) 25 mg tablet Take 25 mg by mouth daily.  omeprazole (PRILOSEC) 40 mg capsule Take 40 mg by mouth daily.  valsartan (DIOVAN) 80 mg tablet Take 80 mg by mouth daily.  atorvastatin (LIPITOR) 10 mg tablet Take 10 mg by mouth daily.  ergocalciferol (Vitamin D2) 1,250 mcg (50,000 unit) capsule Take 50,000 Units by mouth every seven (7) days.  ASA/mag hydrox/aluminum hydrox (ARTHRITIS PAIN FORMULA PO) Take 650 mg by mouth.     alendronate (FOSAMAX) 70 mg tablet Take 70 mg by mouth every seven (7) days. No current facility-administered medications for this visit. Past Medical History:   Diagnosis Date    Anemia     Arrhythmia     HX MITRAL VALVE PROLAPSE, HAS BEEN SINCE DIAGNOSED NEGATIVE     Arthritis     Autoimmune disease ([facility] [address]     HLA B27    Chronic pain     LEGS, AND WHOLE BODY     GERD (gastroesophageal reflux disease)     High cholesterol     Hypertension     Osteoporosis     Sleep apnea         Past Surgical History:   Procedure Laterality Date    HX CATARACT REMOVAL Bilateral 2016    HX  SECTION  1982    X1    HX HEART CATHETERIZATION  2014    HX HEENT  2010    ORAL SURGERY     HX HERNIA REPAIR      HX HERNIA REPAIR      HX HYSTERECTOMY      HX LUMBAR FUSION      HX TONSILLECTOMY      HX TONSILLECTOMY      HX WISDOM TEETH EXTRACTION      IR INJ FORAMIN EPID LUMB ANES/STER SNGL  2019       Family History   Problem Relation Age of Onset    Alzheimer's Disease Mother     Heart Disease Father     Cancer Brother         COLON     Other Brother         ALS    Other Daughter         BRAIN TUMOR, TRIGEMINAL NEURALGIA     Colon Cancer Other     Arthritis Other     Migraines Other     Alcohol abuse Other     Anesth Problems Neg Hx         Social History     Tobacco Use    Smoking status: Former Smoker     Quit date: 10/28/1967     Years since quittin.0    Smokeless tobacco: Never Used    Tobacco comment: 1 PACK A WEEK    Substance Use Topics    Alcohol use: No        Review of Systems   Constitutional: Negative. Respiratory: Negative. Cardiovascular: Negative. Gastrointestinal: Negative. Endocrine: Negative. Genitourinary: Negative. Musculoskeletal: Positive for back pain. Skin: Negative. Allergic/Immunologic: Negative. Hematological: Negative. Psychiatric/Behavioral: Negative. All other systems reviewed and are negative. No flowsheet data found.         Vitals:  Ht 5' 5\" (1.651 m)   Wt 190 lb (86.2 kg)   BMI 31.62 kg/m²    Body mass index is 31.62 kg/m². Physical Exam    Low back pain: Upon examination of the lumbar spine, the patient is tender to palpation over the paraspinal musculature. They are nontender over the spinus processes and have no crepitus with ranging. They have limited ROM of the spine due to discomfort. There is significant tenderness to palpation over the left sacroiliac region. They have a negative straight leg raise test. There is no soft tissue swelling and eccymosis. The patient has full range of motion of the hips. They have 5/5 strength, and are neurovascularly intact distally. Bilateral lower extremity reflexes are 2+ and symmetric. There is no erythema, warmth or skin lesions present. Dr. Cecilia Roa was available for immediate consult during this encounter. An electronic signature was used to authenticate this note.   -- York Siemens, PA

## 2021-11-18 ENCOUNTER — HOSPITAL ENCOUNTER (OUTPATIENT)
Dept: CT IMAGING | Age: 71
Discharge: HOME | End: 2021-11-18
Attending: PHYSICIAN ASSISTANT
Payer: MEDICARE

## 2021-11-18 DIAGNOSIS — R10.2 PELVIC PAIN IN FEMALE: ICD-10-CM

## 2021-11-18 DIAGNOSIS — Z98.1 S/P LUMBAR FUSION: ICD-10-CM

## 2021-11-18 PROCEDURE — 72131 CT LUMBAR SPINE W/O DYE: CPT

## 2021-11-18 PROCEDURE — 72192 CT PELVIS W/O DYE: CPT

## 2021-11-19 ENCOUNTER — TELEPHONE (OUTPATIENT)
Dept: ORTHOPEDIC SURGERY | Age: 71
End: 2021-11-19

## 2021-11-19 NOTE — TELEPHONE ENCOUNTER
Jerald Naranjo asking for MRI results. This is for the CT of her Lumbar spine and her pelvic. Per patient Ryan Soto wanted her to call asap.

## 2021-11-29 ENCOUNTER — PATIENT MESSAGE (OUTPATIENT)
Dept: ORTHOPEDIC SURGERY | Age: 71
End: 2021-11-29

## 2021-12-01 NOTE — TELEPHONE ENCOUNTER
[facility] [place] for patient to call back. She has loosening of screws bilaterally at S1. Discussed with Dr. Palmer Retana and he understands her current situation with bilateral stress fractures in the femur. He reports that this can be treated conservatively, and we may be able to try use of a bone stimulator to see if this will heal.  If she would like to proceed with possible surgical intervention, this can certainly be discussed with Dr. Palmer Retana in an office visit at her convenience (nonemergently). [facility], [facility]    ----- Message from Sreekanth Gan sent at 11/19/2021  2:18 PM EST -----  Regarding: CT RESULTS  Patient completed her CT at [facility] and would like you to call her to discuss the results. She was made aware that you are out of the office until 11/29/21. Best contact # is [phone].

## 2022-03-15 ENCOUNTER — OFFICE VISIT (OUTPATIENT)
Dept: ORTHOPEDIC SURGERY | Age: 72
End: 2022-03-15
Payer: MEDICARE

## 2022-03-15 VITALS — BODY MASS INDEX: 31.65 KG/M2 | WEIGHT: 190 LBS | HEIGHT: 65 IN

## 2022-03-15 DIAGNOSIS — G89.29 CHRONIC BILATERAL LOW BACK PAIN WITHOUT SCIATICA: ICD-10-CM

## 2022-03-15 DIAGNOSIS — S32.009K PSEUDOARTHROSIS OF LUMBAR SPINE: ICD-10-CM

## 2022-03-15 DIAGNOSIS — M54.50 CHRONIC BILATERAL LOW BACK PAIN WITHOUT SCIATICA: ICD-10-CM

## 2022-03-15 DIAGNOSIS — Z98.1 S/P LUMBAR FUSION: Primary | ICD-10-CM

## 2022-03-15 PROCEDURE — G9899 SCRN MAM PERF RSLTS DOC: HCPCS | Performed by: ORTHOPAEDIC SURGERY

## 2022-03-15 PROCEDURE — G8536 NO DOC ELDER MAL SCRN: HCPCS | Performed by: ORTHOPAEDIC SURGERY

## 2022-03-15 PROCEDURE — G8432 DEP SCR NOT DOC, RNG: HCPCS | Performed by: ORTHOPAEDIC SURGERY

## 2022-03-15 PROCEDURE — 3017F COLORECTAL CA SCREEN DOC REV: CPT | Performed by: ORTHOPAEDIC SURGERY

## 2022-03-15 PROCEDURE — G8427 DOCREV CUR MEDS BY ELIG CLIN: HCPCS | Performed by: ORTHOPAEDIC SURGERY

## 2022-03-15 PROCEDURE — 1101F PT FALLS ASSESS-DOCD LE1/YR: CPT | Performed by: ORTHOPAEDIC SURGERY

## 2022-03-15 PROCEDURE — 1090F PRES/ABSN URINE INCON ASSESS: CPT | Performed by: ORTHOPAEDIC SURGERY

## 2022-03-15 PROCEDURE — G8417 CALC BMI ABV UP PARAM F/U: HCPCS | Performed by: ORTHOPAEDIC SURGERY

## 2022-03-15 PROCEDURE — 99214 OFFICE O/P EST MOD 30 MIN: CPT | Performed by: ORTHOPAEDIC SURGERY

## 2022-03-15 PROCEDURE — G8399 PT W/DXA RESULTS DOCUMENT: HCPCS | Performed by: ORTHOPAEDIC SURGERY

## 2022-03-15 NOTE — LETTER
3/15/2022 3:13 PM    Ms. Isaiah Beebe [address] [place] [id number]        NOTIFICATION RETURN TO WORK       WORK STATUS AND RESTRICTIONS / WHAT Eitan Shin CAN DO SAFELY    To Whom It May Concern:    Eitan Shin  is currently under the care of Antonio Carl. She is able to go to the gym.                  Sincerely,      Matt Royal MD

## 2022-03-15 NOTE — PROGRESS NOTES
Gali Vega (: 1950) is a 66 y.o. female, patient, here for evaluation of the following chief complaint(s):  Back Pain (Lumbar CT Results 2021)       ASSESSMENT/PLAN:    Below is the assessment and plan developed based on review of pertinent history, physical exam, labs, studies, and medications. We had a lengthy discussion about her options at this point. She is having some S1 lucency but no acute changes in the hardware. She is using a spinal cord stimulator. She still has back pain but also has leg pain. She does have bilateral femur stress fractures related to her chronic osteoporosis type medications. I have recommended that she continue with conservative treatment at this time. She will increase activities as tolerated. I will see her back in 3 more months. We will repeat x-rays at that time. She will see me back if she has any complications prior to that. 30 minutes were spent with her today with consultation. 1. S/P lumbar fusion  -     XR SPINE LUMB MIN 4 V; Future  2. Chronic bilateral low back pain without sciatica  3. Pseudoarthrosis of lumbar spine      No follow-ups on file. SUBJECTIVE/OBJECTIVE:  Gali Vega (: 1950) is a 66 y.o. female. No flowsheet data found. She comes in today for follow-up. She is having chronic back pain and buttock pain. She has had pain off and on for almost 16 months since her surgery. She has been diagnosed with bilateral stress fractures due to her osteoporotic medication. She has been off that since September. She has back pain and buttock pain. She also has bilateral thigh pain from the stress fractures. She has been using her spinal cord stimulator consistently. No acute changes otherwise    Imaging:    XR Results (most recent):  Results from Appointment encounter on 03/15/22    XR SPINE LUMB MIN 4 V    Narrative  4 views of the lumbar spine reviewed today.   She has a stable revision fusion from L4-S1. No changes in the lucency at S1 other than it is more noticeable on the right-hand side. Minimal on the left. No instability with flexion-extension       I reviewed the CT scan of her lumbar spine. It demonstrates that she has a stable L4-S1 fusion. Mild lucency of the S1 screws right greater than left. No stenosis noted. MRI Results (most recent):  Results from [place] encounter on 04/20/17    MRI CERV SPINE WO CONT    Narrative  EXAM:  MRI CERV SPINE WO CONT  Conical history: Cervical radiculopathy      INDICATION:  Cervical radiculopathy. COMPARISON: None    TECHNIQUE: MR imaging of the cervical spine was performed using the following  sequences: sagittal T1, T2, STIR;  axial T2, T1.    CONTRAST:  None. FINDINGS:    There is normal alignment of the cervical spine. Vertebral body heights are  maintained. Marrow signal is normal.    The craniocervical junction is intact. The course, caliber, and signal  intensity of the spinal cord are normal.  There is no Chiari or syrinx. Posterior fossa structures visualized are within  normal limits. The paraspinal soft tissues are within normal limits. C2-C3:  There is a fusion of C2-C3. C3-C4:  Disc bulge. Mild facet arthropathy. Canal and foramina are patent    C4-C5:  Mild facet arthropathy. Disc bulge. Mild canal stenosis. Foramina are  patent    C5-C6:  Mild right paracentral disc protrusion. Mild canal stenosis. Foramina  are patent. C6-C7:  Mild broad-based protrusion. Mild canal left foraminal stenosis. C7-T1:  No herniation or stenosis. Impression  IMPRESSION:  Interval fusion at C2-C3. Mild canal stenoses at C4-5 C5-C6 and C6-C7. Mild left foraminal stenosis at C6-C7. No cord signal abnormality.          Allergies   Allergen Reactions    Penicillins Anaphylaxis    Codeine Itching     ANXIOUS AND NERVOUS      Sulfa (Sulfonamide Antibiotics) Hives       Current Outpatient Medications   Medication Sig    gabapentin (NEURONTIN) 300 mg capsule Take 300 mg by mouth three (3) times daily.  losartan (COZAAR) 100 mg tablet Take 100 mg by mouth daily.  famotidine (PEPCID) 10 mg tablet Take 10 mg by mouth two (2) times a day.  flecainide (TAMBOCOR) 50 mg tablet Take  by mouth two (2) times a day.  hydroCHLOROthiazide (HYDRODIURIL) 25 mg tablet Take 25 mg by mouth daily.  omeprazole (PRILOSEC) 40 mg capsule Take 40 mg by mouth daily.  valsartan (DIOVAN) 80 mg tablet Take 80 mg by mouth daily.  atorvastatin (LIPITOR) 10 mg tablet Take 10 mg by mouth daily.  ergocalciferol (Vitamin D2) 1,250 mcg (50,000 unit) capsule Take 50,000 Units by mouth every seven (7) days.  ASA/mag hydrox/aluminum hydrox (ARTHRITIS PAIN FORMULA PO) Take 650 mg by mouth.  alendronate (FOSAMAX) 70 mg tablet Take 70 mg by mouth every seven (7) days. No current facility-administered medications for this visit.        Past Medical History:   Diagnosis Date    Anemia     Arrhythmia     HX MITRAL VALVE PROLAPSE, HAS BEEN SINCE DIAGNOSED NEGATIVE     Arthritis     Autoimmune disease ([facility] [address]     HLA B27    Chronic pain     LEGS, AND WHOLE BODY     GERD (gastroesophageal reflux disease)     High cholesterol     Hypertension     Osteoporosis     Sleep apnea         Past Surgical History:   Procedure Laterality Date    HX CATARACT REMOVAL Bilateral 2016    HX  SECTION  1982    X1    HX HEART CATHETERIZATION  2014    HX HEENT  2010    ORAL SURGERY     HX HERNIA REPAIR      HX HERNIA REPAIR      HX HYSTERECTOMY      HX LUMBAR FUSION      HX TONSILLECTOMY      HX TONSILLECTOMY      HX WISDOM TEETH EXTRACTION      IR INJ FORAMIN EPID LUMB ANES/STER SNGL  2019       Family History   Problem Relation Age of Onset    Alzheimer's Disease Mother     Heart Disease Father     Cancer Brother         COLON     Other Brother         ALS    Other Daughter         BRAIN TUMOR, TRIGEMINAL NEURALGIA     Colon Cancer Other     Arthritis Other     Migraines Other     Alcohol abuse Other     Anesth Problems Neg Hx         Social History     Tobacco Use    Smoking status: Former Smoker     Quit date: 10/28/1967     Years since quittin.4    Smokeless tobacco: Never Used    Tobacco comment: 1 PACK A WEEK    Substance Use Topics    Alcohol use: No    Drug use: No        Review of Systems   Musculoskeletal: Positive for back pain and gait problem. All other systems reviewed and are negative. Vitals:  Ht 5' 5\" (1.651 m)   Wt 190 lb (86.2 kg)   BMI 31.62 kg/m²    Body mass index is 31.62 kg/m². Ortho Exam       Integumentary  Assessment of Surgical Incision - healing and consistent with normal anticipated wound healing. Neurologic  Sensory  Light Touch - Intact - Globally. Overall Assessment of Muscle Strength and Tone reveals  Lower Extremities - Right Iliopsoas - 5/5. Left Iliopsoas - 5/5. Right Tibialis Anterior - 5/5. Left Tibialis Anterior - 5/5. Right Gastroc-Soleus - 5/5. Left Gastroc-Soleus - 5/5. Right EHL - 5/5. Left EHL - 5/5. General Assessment of Reflexes  Right Ankle - Clonus is not present. Left Ankle - Clonus is not present. Reflexes (Dermatomes)  2/2 Normal - Left Achilles (L5-S2), Left Knee (L2-4), Right Achilles (L5-S2) and Right Knee (L2-4). Musculoskeletal  Global Assessment  Examination of related systems reveals - well-developed, well-nourished, in no acute distress, alert and oriented x 3 and normal coordination. Spine/Ribs/Pelvis  Lumbosacral Spine - Examination of the lumbosacral spine reveals - no known fractures or deformities. Inspection and Palpation - Tenderness - moderate. Assessment of pain reveals the following findings - The pain is characterized as - moderate. Location - pain refers to lower back bilaterally. An electronic signature was used to authenticate this note.   -- Aracelis Joe MD

## 2022-03-18 PROBLEM — M51.36 DDD (DEGENERATIVE DISC DISEASE), LUMBAR: Status: ACTIVE | Noted: 2021-10-23

## 2022-03-18 PROBLEM — M51.369 DDD (DEGENERATIVE DISC DISEASE), LUMBAR: Status: ACTIVE | Noted: 2021-10-23

## 2022-03-19 PROBLEM — G89.29 CHRONIC BILATERAL LOW BACK PAIN WITHOUT SCIATICA: Status: ACTIVE | Noted: 2021-10-23

## 2022-03-19 PROBLEM — M54.2 CERVICALGIA: Status: ACTIVE | Noted: 2021-10-23

## 2022-03-19 PROBLEM — M51.369 LUMBAR ADJACENT SEGMENT DISEASE WITH SPONDYLOLISTHESIS: Status: ACTIVE | Noted: 2021-10-23

## 2022-03-19 PROBLEM — M17.0 PRIMARY OSTEOARTHRITIS OF BOTH KNEES: Status: ACTIVE | Noted: 2021-10-23

## 2022-03-19 PROBLEM — M48.061 DEGENERATIVE LUMBAR SPINAL STENOSIS: Status: ACTIVE | Noted: 2021-10-23

## 2022-03-19 PROBLEM — M43.16 LUMBAR ADJACENT SEGMENT DISEASE WITH SPONDYLOLISTHESIS: Status: ACTIVE | Noted: 2021-10-23

## 2022-03-19 PROBLEM — M48.061 SPINAL STENOSIS, LUMBAR: Status: ACTIVE | Noted: 2020-11-10

## 2022-03-19 PROBLEM — M51.36 LUMBAR ADJACENT SEGMENT DISEASE WITH SPONDYLOLISTHESIS: Status: ACTIVE | Noted: 2021-10-23

## 2022-03-19 PROBLEM — M50.30 DDD (DEGENERATIVE DISC DISEASE), CERVICAL: Status: ACTIVE | Noted: 2021-10-23

## 2022-03-19 PROBLEM — Z98.1 S/P LUMBAR FUSION: Status: ACTIVE | Noted: 2021-10-23

## 2022-03-19 PROBLEM — G62.9 PERIPHERAL NEUROPATHY: Status: ACTIVE | Noted: 2021-10-23

## 2022-03-19 PROBLEM — M54.50 CHRONIC BILATERAL LOW BACK PAIN WITHOUT SCIATICA: Status: ACTIVE | Noted: 2021-10-23

## 2022-03-19 PROBLEM — M40.202 CERVICAL KYPHOSIS: Status: ACTIVE | Noted: 2021-10-23

## 2022-03-20 PROBLEM — M43.10 SPONDYLOLISTHESIS, ACQUIRED: Status: ACTIVE | Noted: 2021-10-23

## 2022-03-24 PROBLEM — S32.009K PSEUDOARTHROSIS OF LUMBAR SPINE: Status: ACTIVE | Noted: 2022-03-15

## 2022-04-04 ENCOUNTER — TRANSCRIBE ORDER (OUTPATIENT)
Dept: SCHEDULING | Age: 72
End: 2022-04-04

## 2022-04-04 DIAGNOSIS — Z12.31 SCREENING MAMMOGRAM FOR HIGH-RISK PATIENT: Primary | ICD-10-CM

## 2022-04-12 ENCOUNTER — HOSPITAL ENCOUNTER (OUTPATIENT)
Dept: MAMMOGRAPHY | Age: 72
Discharge: HOME | End: 2022-04-12
Attending: FAMILY MEDICINE
Payer: MEDICARE

## 2022-04-12 DIAGNOSIS — Z12.31 SCREENING MAMMOGRAM FOR HIGH-RISK PATIENT: ICD-10-CM

## 2022-04-12 PROCEDURE — 77067 SCR MAMMO BI INCL CAD: CPT

## 2022-05-18 ENCOUNTER — TELEPHONE (OUTPATIENT)
Dept: ORTHOPEDIC SURGERY | Age: 72
End: 2022-05-18

## 2022-05-18 NOTE — TELEPHONE ENCOUNTER
Jet Guzman calls to report she stopped wearing her bone stimulator on 5/9t due to a femur Fracture and then stimulator is too painful for her wear with the new surgery while shes in bed.      Em Caldwell RN, BSN  [facility]istered [facility] [phone]  Direct Line [phone]

## 2023-03-23 ENCOUNTER — TRANSCRIBE ORDER (OUTPATIENT)
Dept: SCHEDULING | Age: 73
End: 2023-03-23

## 2023-03-23 DIAGNOSIS — Z12.31 VISIT FOR SCREENING MAMMOGRAM: Primary | ICD-10-CM

## 2023-04-21 ENCOUNTER — HOSPITAL ENCOUNTER (OUTPATIENT)
Dept: MAMMOGRAPHY | Age: 73
Discharge: HOME | End: 2023-04-21
Attending: FAMILY MEDICINE
Payer: MEDICARE

## 2023-04-21 DIAGNOSIS — Z12.31 VISIT FOR SCREENING MAMMOGRAM: ICD-10-CM

## 2023-04-21 PROCEDURE — 77067 SCR MAMMO BI INCL CAD: CPT

## 2023-04-23 DIAGNOSIS — Z12.31 VISIT FOR SCREENING MAMMOGRAM: Primary | ICD-10-CM

## 2023-05-17 ENCOUNTER — OFFICE VISIT (OUTPATIENT)
Age: 73
End: 2023-05-17
Payer: MEDICARE

## 2023-05-17 ENCOUNTER — TELEPHONE (OUTPATIENT)
Age: 73
End: 2023-05-17

## 2023-05-17 VITALS
BODY MASS INDEX: 33.32 KG/M2 | TEMPERATURE: 97.4 F | OXYGEN SATURATION: 95 % | HEIGHT: 65 IN | RESPIRATION RATE: 18 BRPM | DIASTOLIC BLOOD PRESSURE: 64 MMHG | HEART RATE: 63 BPM | WEIGHT: 200 LBS | SYSTOLIC BLOOD PRESSURE: 126 MMHG

## 2023-05-17 DIAGNOSIS — J30.89 SEASONAL ALLERGIC RHINITIS DUE TO OTHER ALLERGIC TRIGGER: ICD-10-CM

## 2023-05-17 DIAGNOSIS — Z00.00 WELL ADULT HEALTH CHECK: ICD-10-CM

## 2023-05-17 DIAGNOSIS — E78.00 ELEVATED CHOLESTEROL: ICD-10-CM

## 2023-05-17 DIAGNOSIS — H92.02 LEFT EAR PAIN: ICD-10-CM

## 2023-05-17 DIAGNOSIS — I10 ESSENTIAL HYPERTENSION: Primary | ICD-10-CM

## 2023-05-17 DIAGNOSIS — E55.9 HYPOVITAMINOSIS D: ICD-10-CM

## 2023-05-17 PROCEDURE — 3074F SYST BP LT 130 MM HG: CPT | Performed by: INTERNAL MEDICINE

## 2023-05-17 PROCEDURE — 4004F PT TOBACCO SCREEN RCVD TLK: CPT | Performed by: INTERNAL MEDICINE

## 2023-05-17 PROCEDURE — 99204 OFFICE O/P NEW MOD 45 MIN: CPT | Performed by: INTERNAL MEDICINE

## 2023-05-17 PROCEDURE — 1090F PRES/ABSN URINE INCON ASSESS: CPT | Performed by: INTERNAL MEDICINE

## 2023-05-17 PROCEDURE — 3078F DIAST BP <80 MM HG: CPT | Performed by: INTERNAL MEDICINE

## 2023-05-17 PROCEDURE — 1123F ACP DISCUSS/DSCN MKR DOCD: CPT | Performed by: INTERNAL MEDICINE

## 2023-05-17 PROCEDURE — G8399 PT W/DXA RESULTS DOCUMENT: HCPCS | Performed by: INTERNAL MEDICINE

## 2023-05-17 PROCEDURE — G8427 DOCREV CUR MEDS BY ELIG CLIN: HCPCS | Performed by: INTERNAL MEDICINE

## 2023-05-17 PROCEDURE — 3017F COLORECTAL CA SCREEN DOC REV: CPT | Performed by: INTERNAL MEDICINE

## 2023-05-17 PROCEDURE — G8417 CALC BMI ABV UP PARAM F/U: HCPCS | Performed by: INTERNAL MEDICINE

## 2023-05-17 RX ORDER — METOPROLOL SUCCINATE 25 MG/1
25 TABLET, EXTENDED RELEASE ORAL DAILY
COMMUNITY
Start: 2023-03-24

## 2023-05-17 RX ORDER — SENNOSIDES 8.6 MG
CAPSULE ORAL
COMMUNITY

## 2023-05-17 RX ORDER — FLUTICASONE PROPIONATE 50 MCG
2 SPRAY, SUSPENSION (ML) NASAL DAILY
Qty: 16 G | Refills: 5 | Status: SHIPPED | OUTPATIENT
Start: 2023-05-17

## 2023-05-17 RX ORDER — FAMOTIDINE 40 MG/1
40 TABLET, FILM COATED ORAL 2 TIMES DAILY
COMMUNITY
Start: 2023-03-01

## 2023-05-17 SDOH — ECONOMIC STABILITY: FOOD INSECURITY: WITHIN THE PAST 12 MONTHS, YOU WORRIED THAT YOUR FOOD WOULD RUN OUT BEFORE YOU GOT MONEY TO BUY MORE.: NEVER TRUE

## 2023-05-17 SDOH — ECONOMIC STABILITY: HOUSING INSECURITY: IN THE LAST 12 MONTHS, HOW MANY PLACES HAVE YOU LIVED?: 1

## 2023-05-17 SDOH — ECONOMIC STABILITY: INCOME INSECURITY: IN THE LAST 12 MONTHS, WAS THERE A TIME WHEN YOU WERE NOT ABLE TO PAY THE MORTGAGE OR RENT ON TIME?: NO

## 2023-05-17 SDOH — ECONOMIC STABILITY: TRANSPORTATION INSECURITY
IN THE PAST 12 MONTHS, HAS LACK OF TRANSPORTATION KEPT YOU FROM MEETINGS, WORK, OR FROM GETTING THINGS NEEDED FOR DAILY LIVING?: NO

## 2023-05-17 SDOH — ECONOMIC STABILITY: TRANSPORTATION INSECURITY
IN THE PAST 12 MONTHS, HAS THE LACK OF TRANSPORTATION KEPT YOU FROM MEDICAL APPOINTMENTS OR FROM GETTING MEDICATIONS?: NO

## 2023-05-17 SDOH — ECONOMIC STABILITY: HOUSING INSECURITY
IN THE LAST 12 MONTHS, WAS THERE A TIME WHEN YOU DID NOT HAVE A STEADY PLACE TO SLEEP OR SLEPT IN A SHELTER (INCLUDING NOW)?: NO

## 2023-05-17 SDOH — ECONOMIC STABILITY: FOOD INSECURITY: WITHIN THE PAST 12 MONTHS, THE FOOD YOU BOUGHT JUST DIDN'T LAST AND YOU DIDN'T HAVE MONEY TO GET MORE.: NEVER TRUE

## 2023-05-17 ASSESSMENT — SOCIAL DETERMINANTS OF HEALTH (SDOH): HOW HARD IS IT FOR YOU TO PAY FOR THE VERY BASICS LIKE FOOD, HOUSING, MEDICAL CARE, AND HEATING?: NOT HARD AT ALL

## 2023-05-18 LAB
25(OH)D3 SERPL-MCNC: 90.6 NG/ML (ref 30–100)
ALBUMIN SERPL-MCNC: 3.6 G/DL (ref 3.5–5)
ALBUMIN/GLOB SERPL: 1 (ref 1.1–2.2)
ALP SERPL-CCNC: 84 U/L (ref 45–117)
ALT SERPL-CCNC: 25 U/L (ref 12–78)
ANION GAP SERPL CALC-SCNC: 4 MMOL/L (ref 5–15)
AST SERPL-CCNC: 22 U/L (ref 15–37)
BASOPHILS # BLD: 0.1 K/UL (ref 0–0.1)
BASOPHILS NFR BLD: 1 % (ref 0–1)
BILIRUB SERPL-MCNC: 0.3 MG/DL (ref 0.2–1)
BUN SERPL-MCNC: 21 MG/DL (ref 6–20)
BUN/CREAT SERPL: 20 (ref 12–20)
CALCIUM SERPL-MCNC: 9.4 MG/DL (ref 8.5–10.1)
CHLORIDE SERPL-SCNC: 109 MMOL/L (ref 97–108)
CK SERPL-CCNC: 50 U/L (ref 26–192)
CO2 SERPL-SCNC: 28 MMOL/L (ref 21–32)
CREAT SERPL-MCNC: 1.06 MG/DL (ref 0.55–1.02)
DIFFERENTIAL METHOD BLD: ABNORMAL
EOSINOPHIL # BLD: 0.2 K/UL (ref 0–0.4)
EOSINOPHIL NFR BLD: 4 % (ref 0–7)
ERYTHROCYTE [DISTWIDTH] IN BLOOD BY AUTOMATED COUNT: 13.2 % (ref 11.5–14.5)
EST. AVERAGE GLUCOSE BLD GHB EST-MCNC: 111 MG/DL
GLOBULIN SER CALC-MCNC: 3.6 G/DL (ref 2–4)
GLUCOSE SERPL-MCNC: 73 MG/DL (ref 65–100)
HBA1C MFR BLD: 5.5 % (ref 4–5.6)
HCT VFR BLD AUTO: 35.7 % (ref 35–47)
HGB BLD-MCNC: 11.9 G/DL (ref 11.5–16)
IMM GRANULOCYTES # BLD AUTO: 0 K/UL (ref 0–0.04)
IMM GRANULOCYTES NFR BLD AUTO: 0 % (ref 0–0.5)
LYMPHOCYTES # BLD: 1.2 K/UL (ref 0.8–3.5)
LYMPHOCYTES NFR BLD: 27 % (ref 12–49)
MCH RBC QN AUTO: 33.3 PG (ref 26–34)
MCHC RBC AUTO-ENTMCNC: 33.3 G/DL (ref 30–36.5)
MCV RBC AUTO: 100 FL (ref 80–99)
MONOCYTES # BLD: 0.4 K/UL (ref 0–1)
MONOCYTES NFR BLD: 10 % (ref 5–13)
NEUTS SEG # BLD: 2.7 K/UL (ref 1.8–8)
NEUTS SEG NFR BLD: 59 % (ref 32–75)
NRBC # BLD: 0 K/UL (ref 0–0.01)
NRBC BLD-RTO: 0 PER 100 WBC
PLATELET # BLD AUTO: 239 K/UL (ref 150–400)
POTASSIUM SERPL-SCNC: 4.4 MMOL/L (ref 3.5–5.1)
PROT SERPL-MCNC: 7.2 G/DL (ref 6.4–8.2)
RBC # BLD AUTO: 3.57 M/UL (ref 3.8–5.2)
SODIUM SERPL-SCNC: 141 MMOL/L (ref 136–145)
WBC # BLD AUTO: 4.5 K/UL (ref 3.6–11)

## 2023-06-02 ENCOUNTER — TELEPHONE (OUTPATIENT)
Age: 73
End: 2023-06-02

## 2023-06-16 ENCOUNTER — TELEPHONE (OUTPATIENT)
Age: 73
End: 2023-06-16

## 2023-06-16 NOTE — TELEPHONE ENCOUNTER
Pt called and was made aware that referral was sent over. Pt stated that the reason she wanted to talk was due to medication that she needed refilled.  Pt was made aware that message would be brought to PCP attention    Rupert Magallanes LPN

## 2023-06-21 RX ORDER — ERGOCALCIFEROL 1.25 MG/1
50000 CAPSULE ORAL
Qty: 15 CAPSULE | Refills: 1 | Status: SHIPPED | OUTPATIENT
Start: 2023-06-21

## 2023-07-13 ENCOUNTER — OFFICE VISIT (OUTPATIENT)
Age: 73
End: 2023-07-13
Payer: MEDICARE

## 2023-07-13 VITALS
WEIGHT: 199 LBS | BODY MASS INDEX: 31.23 KG/M2 | RESPIRATION RATE: 18 BRPM | SYSTOLIC BLOOD PRESSURE: 135 MMHG | DIASTOLIC BLOOD PRESSURE: 63 MMHG | HEART RATE: 66 BPM | TEMPERATURE: 97.7 F | HEIGHT: 67 IN | OXYGEN SATURATION: 98 %

## 2023-07-13 DIAGNOSIS — E78.00 ELEVATED CHOLESTEROL: ICD-10-CM

## 2023-07-13 DIAGNOSIS — I10 ESSENTIAL HYPERTENSION: Primary | ICD-10-CM

## 2023-07-13 DIAGNOSIS — E55.9 HYPOVITAMINOSIS D: ICD-10-CM

## 2023-07-13 PROCEDURE — 3078F DIAST BP <80 MM HG: CPT | Performed by: INTERNAL MEDICINE

## 2023-07-13 PROCEDURE — G8427 DOCREV CUR MEDS BY ELIG CLIN: HCPCS | Performed by: INTERNAL MEDICINE

## 2023-07-13 PROCEDURE — 99215 OFFICE O/P EST HI 40 MIN: CPT | Performed by: INTERNAL MEDICINE

## 2023-07-13 PROCEDURE — 1090F PRES/ABSN URINE INCON ASSESS: CPT | Performed by: INTERNAL MEDICINE

## 2023-07-13 PROCEDURE — 1123F ACP DISCUSS/DSCN MKR DOCD: CPT | Performed by: INTERNAL MEDICINE

## 2023-07-13 PROCEDURE — G8399 PT W/DXA RESULTS DOCUMENT: HCPCS | Performed by: INTERNAL MEDICINE

## 2023-07-13 PROCEDURE — G8417 CALC BMI ABV UP PARAM F/U: HCPCS | Performed by: INTERNAL MEDICINE

## 2023-07-13 PROCEDURE — 3074F SYST BP LT 130 MM HG: CPT | Performed by: INTERNAL MEDICINE

## 2023-07-13 PROCEDURE — 3017F COLORECTAL CA SCREEN DOC REV: CPT | Performed by: INTERNAL MEDICINE

## 2023-07-13 PROCEDURE — 1036F TOBACCO NON-USER: CPT | Performed by: INTERNAL MEDICINE

## 2023-07-13 RX ORDER — ERGOCALCIFEROL 1.25 MG/1
50000 CAPSULE ORAL
Qty: 15 CAPSULE | Refills: 1 | Status: SHIPPED | OUTPATIENT
Start: 2023-07-13

## 2023-07-13 ASSESSMENT — PATIENT HEALTH QUESTIONNAIRE - PHQ9
SUM OF ALL RESPONSES TO PHQ QUESTIONS 1-9: 0
1. LITTLE INTEREST OR PLEASURE IN DOING THINGS: 0
SUM OF ALL RESPONSES TO PHQ QUESTIONS 1-9: 0
SUM OF ALL RESPONSES TO PHQ QUESTIONS 1-9: 0
SUM OF ALL RESPONSES TO PHQ9 QUESTIONS 1 & 2: 0
SUM OF ALL RESPONSES TO PHQ QUESTIONS 1-9: 0
2. FEELING DOWN, DEPRESSED OR HOPELESS: 0

## 2023-07-13 NOTE — PROGRESS NOTES
Sheridan Acevedo (: 1950) is a 74 y.o. female, established patient, here for evaluation of the following chief complaint(s):  Chief Complaint   Patient presents with    Hypertension    Follow-up       Assessment and Plan:      Diagnosis Orders   1. Essential hypertension        2. Elevated cholesterol  Lipid Panel      3. Hypovitaminosis D  ergocalciferol (ERGOCALCIFEROL) 1.25 MG (34906 UT) capsule      4. BMI 33.0-33.9,[facility]oe[facility], AMBROSEN, MRM OP Nutrition, Nutrition Services, [address]          1:  Monitoring and follow-up reviewed. 2:  Follow-up monitoring and mgt reviewed. 3:  Re-sent script as reviewed. 4:  Referral(s) and referral coordination reviewed with patient at visit. Return in about 4 months (around 2023) for Blood Pressure follow-up (no labs), --needs 30min visit.  lab results and schedule of future lab studies reviewed with patient  reviewed medications and side effects in detail    Plan and evaluation (above) reviewed with pt at visit  Patient voiced understanding of plan and provided with time to ask/review questions. After Visit Summary (AVS) provided to pt after visit with additional instructions as needed/reviewed. Future Appointments   Date Time Provider [address]   2023  2:30 PM Monica Ma MD [facility] AMB   --Updated future visits after patient check-out. --As reviewed, she will do 4mo then 6mo follow-up and plan initial AWV in May-2024. On this date 2023 I have spent 41 minutes reviewing previous notes, test results and face to face with the patient discussing the diagnosis and importance of compliance with the treatment plan as well as documenting on the day of the visit. History of Present Illness:     Notes (nursing/rooming note copied below in italics):  As above    Established care with me 23. She had reviewed lab letter and no concerns or questions.     She is fasting for labs

## 2023-07-14 LAB
CHOLEST SERPL-MCNC: 167 MG/DL
HDLC SERPL-MCNC: 40 MG/DL
HDLC SERPL: 4.2 (ref 0–5)
LDLC SERPL CALC-MCNC: 85.8 MG/DL (ref 0–100)
TRIGL SERPL-MCNC: 206 MG/DL
VLDLC SERPL CALC-MCNC: 41.2 MG/DL

## 2023-07-21 ENCOUNTER — TELEPHONE (OUTPATIENT)
Age: 73
End: 2023-07-21

## 2023-07-21 NOTE — TELEPHONE ENCOUNTER
Pt called in states she you sent her to a [facility] and they do not take her insurance pt is asking for a [facility] that takes her insurance please.

## 2023-07-31 ENCOUNTER — TELEPHONE (OUTPATIENT)
Age: 73
End: 2023-07-31

## 2023-07-31 NOTE — TELEPHONE ENCOUNTER
----- Message from Bartolo Marc sent at 7/31/2023  3:01 PM EDT -----  Subject: Referral Request    Reason for referral request? Nutrition   Provider patient wants to be referred to(if known):     Provider Phone Number(if known): Additional Information for Provider? patient requesting a different   referral to a nutritionist for weight management. [facility] [address] does   not take her insurance for these services. they only take Medicare for   patients with diabetes.   ---------------------------------------------------------------------------  --------------  Sharron Marker LRZX    [phone]; OK to leave message on voicemail  ---------------------------------------------------------------------------  --------------

## 2023-09-06 NOTE — TELEPHONE ENCOUNTER
Last appointment: 07/13/2023 MD Jai Brennan   Next appointment: 11/13/2023 MD Jai Brennan   Previous refill encounter(s):   06/18/2023 Lipitor #[id number]     For Pharmacy Admin Tracking Only    Program: Medication Refill  Intervention Detail: New Rx: 1, reason: Patient Preference  Time Spent (min): 5      Requested Prescriptions     Pending Prescriptions Disp Refills    atorvastatin (LIPITOR) 10 MG tablet 90 tablet 0     Sig: Take 1 tablet by mouth daily

## 2023-09-10 RX ORDER — ATORVASTATIN CALCIUM 10 MG/1
10 TABLET, FILM COATED ORAL DAILY
Qty: 90 TABLET | Refills: 1 | Status: SHIPPED | OUTPATIENT
Start: 2023-09-10

## 2023-10-20 ENCOUNTER — TELEPHONE (OUTPATIENT)
Age: 73
End: 2023-10-20

## 2023-10-20 DIAGNOSIS — E78.00 ELEVATED CHOLESTEROL: ICD-10-CM

## 2023-10-20 DIAGNOSIS — U07.1 COVID-19: Primary | ICD-10-CM

## 2023-10-20 DIAGNOSIS — I10 ESSENTIAL HYPERTENSION: ICD-10-CM

## 2023-10-20 RX ORDER — BENZONATATE 200 MG/1
200 CAPSULE ORAL 3 TIMES DAILY PRN
Qty: 30 CAPSULE | Refills: 0 | Status: SHIPPED | OUTPATIENT
Start: 2023-10-20 | End: 2023-10-30

## 2023-10-20 NOTE — TELEPHONE ENCOUNTER
Spoke with patient, she is having mild covid symptoms with some shortness of breath. She is requesting paxlovid. Advised patient that there is no same day appointments with PCP. Also advised to go to ER or urgent care for worsening symptoms, Patient refused and stated that she was not happy with her care and response to her call. Advised that I would give message to provider to review her chart and to decide if she was a candidate for medication.   Taqueria Diallo LPN

## 2023-10-20 NOTE — TELEPHONE ENCOUNTER
Pt called with concerns regarding COVID. She noted onset of symptoms 2 days ago. Tested yesterday and interested in therapy with Paxlovid. Reviewed dosing and side effects of Paxlovid. --Specifically reviewed mild GI side effects possible change in taste with Paxlovid  --Reviewed blister pack dosing of Paxlovid therapy with 3 pills twice daily x5 days. Reviewed Tessalon for symptomatic therapy. Reviewed if not needed she does not have to take but will prescribe in case needed over weekend. Reviewed regarding drug reactions with Paxlovid, should hold atorvastatin during Paxlovid therapy. Reviewed can restart atorvastatin daily following completion of Paxlovid therapy    Reviewed and confirmed pharmacy with patient. Confirmed medication sent while patient still on phone call. At patient's request sent note to pharmacist with each prescription: \"' Patient needs to  today please contact when ready\". Patient voiced understanding of plan above. Reviewed specific questions regarding blister pack dosing and encouraged her to open box when picked up from pharmacy and make sure she understands dosing prior to leaving pharmacy.

## 2023-10-20 NOTE — TELEPHONE ENCOUNTER
----- Message from Mary Kay Reynolds sent at 10/19/2023  1:17 PM EDT -----  Subject: Message to Provider    QUESTIONS  Information for Provider? Patient is requesting medication be sent to her   pharmacy, as she has been dx with Covid today, 10/19/2023. She is   currently experiencing body aches, chest congestion, and cough. Patient's   preferred pharmacy is [address] [facility] [phone] -  [phone].  ---------------------------------------------------------------------------  --------------  Lyn GARCIA  [phone]; OK to leave message on voicemail  ---------------------------------------------------------------------------  --------------  SCRIPT ANSWERS  Relationship to Patient?  Self

## 2024-02-22 NOTE — TELEPHONE ENCOUNTER
Last appointment: 07/13/2023 MD Lara   Next appointment: 11/13/2023 MD Lara   Previous refill encounter(s):   09/10/2023 Lipitor #90 with 1 refill.     For Pharmacy Admin Tracking Only    Program: Medication Refill  Intervention Detail: New Rx: 1, reason: Patient Preference  Time Spent (min): 5    Requested Prescriptions     Pending Prescriptions Disp Refills    atorvastatin (LIPITOR) 10 MG tablet 90 tablet 0     Sig: Take 1 tablet by mouth daily

## 2024-02-24 RX ORDER — ATORVASTATIN CALCIUM 10 MG/1
10 TABLET, FILM COATED ORAL DAILY
Qty: 90 TABLET | Refills: 0 | Status: SHIPPED | OUTPATIENT
Start: 2024-02-24

## 2024-02-24 NOTE — TELEPHONE ENCOUNTER
Refill request(s) approved--atorvastatin--90-day supply with 0 refill(s).    Refill protocol details (computer-generated) reviewed, as available.    Please contact patient to schedule a follow-up appt.

## 2024-02-27 NOTE — TELEPHONE ENCOUNTER
Pt called in states that her insurance changed and she asking if you can send her medication  -atorvastatin 10mg  to the [facility]

## 2024-03-04 NOTE — TELEPHONE ENCOUNTER
The Lipitor 10 mg #90 was sent to the [facility] #[id number]. Pt is requesting a new prescription to be sent to the [facility] #[id number]    Last appointment: 07/13/2023 MD Lara   Next appointment: 11/13/2023 MD Lara   Previous refill encounter(s):   09/10/2023 Lipitor #90 with 1 refill.     For Pharmacy Admin Tracking Only    Program: Medication Refill  Intervention Detail: New Rx: 1, reason: Patient Preference  Time Spent (min): 5    Requested Prescriptions     Pending Prescriptions Disp Refills    atorvastatin (LIPITOR) 10 MG tablet 90 tablet 0     Sig: Take 1 tablet by mouth daily

## 2024-03-05 RX ORDER — ATORVASTATIN CALCIUM 10 MG/1
10 TABLET, FILM COATED ORAL DAILY
Qty: 90 TABLET | Refills: 1 | Status: SHIPPED | OUTPATIENT
Start: 2024-03-05

## 2024-03-05 NOTE — TELEPHONE ENCOUNTER
Refill request(s) approved--atorvastatin--re-sent to different pharmacy.    Refill protocol details (computer-generated) reviewed, as available.    [employer] message to pt--to schedule follow-up appt.

## 2024-04-01 ENCOUNTER — TRANSCRIBE ORDERS (OUTPATIENT)
Facility: HOSPITAL | Age: 74
End: 2024-04-01

## 2024-04-01 DIAGNOSIS — Z12.31 SCREENING MAMMOGRAM FOR HIGH-RISK PATIENT: Primary | ICD-10-CM

## 2024-05-14 ENCOUNTER — OFFICE VISIT (OUTPATIENT)
Age: 74
End: 2024-05-14
Payer: MEDICARE

## 2024-05-14 ENCOUNTER — TELEPHONE (OUTPATIENT)
Age: 74
End: 2024-05-14

## 2024-05-14 VITALS
OXYGEN SATURATION: 96 % | DIASTOLIC BLOOD PRESSURE: 63 MMHG | HEIGHT: 65 IN | RESPIRATION RATE: 18 BRPM | BODY MASS INDEX: 32.19 KG/M2 | HEART RATE: 76 BPM | WEIGHT: 193.2 LBS | TEMPERATURE: 97.5 F | SYSTOLIC BLOOD PRESSURE: 120 MMHG

## 2024-05-14 DIAGNOSIS — R10.10 PAIN OF UPPER ABDOMEN: Primary | ICD-10-CM

## 2024-05-14 LAB
BILIRUBIN, POC: NORMAL
BLOOD URINE, POC: NORMAL
CLARITY, POC: CLEAR
COLOR, POC: YELLOW
GLUCOSE URINE, POC: NORMAL
KETONES, POC: NORMAL
LEUKOCYTE EST, POC: NORMAL
NITRITE, POC: NORMAL
PH, POC: 6
PROTEIN, POC: NORMAL
SPECIFIC GRAVITY, POC: >=1.03
UROBILINOGEN, POC: NORMAL

## 2024-05-14 PROCEDURE — 3017F COLORECTAL CA SCREEN DOC REV: CPT | Performed by: FAMILY MEDICINE

## 2024-05-14 PROCEDURE — 81003 URINALYSIS AUTO W/O SCOPE: CPT | Performed by: FAMILY MEDICINE

## 2024-05-14 PROCEDURE — 1123F ACP DISCUSS/DSCN MKR DOCD: CPT | Performed by: FAMILY MEDICINE

## 2024-05-14 PROCEDURE — 1036F TOBACCO NON-USER: CPT | Performed by: FAMILY MEDICINE

## 2024-05-14 PROCEDURE — 99214 OFFICE O/P EST MOD 30 MIN: CPT | Performed by: FAMILY MEDICINE

## 2024-05-14 PROCEDURE — PBSHW POCT URINALYSIS DIPSTICK W/O MICROSCOPE (AUTO): Performed by: FAMILY MEDICINE

## 2024-05-14 PROCEDURE — 1090F PRES/ABSN URINE INCON ASSESS: CPT | Performed by: FAMILY MEDICINE

## 2024-05-14 PROCEDURE — G8427 DOCREV CUR MEDS BY ELIG CLIN: HCPCS | Performed by: FAMILY MEDICINE

## 2024-05-14 PROCEDURE — G8417 CALC BMI ABV UP PARAM F/U: HCPCS | Performed by: FAMILY MEDICINE

## 2024-05-14 PROCEDURE — G8399 PT W/DXA RESULTS DOCUMENT: HCPCS | Performed by: FAMILY MEDICINE

## 2024-05-14 RX ORDER — DICYCLOMINE HCL 20 MG
20 TABLET ORAL 4 TIMES DAILY PRN
Qty: 120 TABLET | Refills: 0 | Status: SHIPPED | OUTPATIENT
Start: 2024-05-14

## 2024-05-14 RX ORDER — DICYCLOMINE HCL 20 MG
TABLET ORAL
COMMUNITY
Start: 2024-05-13 | End: 2024-05-14 | Stop reason: SDUPTHER

## 2024-05-14 SDOH — ECONOMIC STABILITY: FOOD INSECURITY: WITHIN THE PAST 12 MONTHS, THE FOOD YOU BOUGHT JUST DIDN'T LAST AND YOU DIDN'T HAVE MONEY TO GET MORE.: NEVER TRUE

## 2024-05-14 SDOH — ECONOMIC STABILITY: FOOD INSECURITY: WITHIN THE PAST 12 MONTHS, YOU WORRIED THAT YOUR FOOD WOULD RUN OUT BEFORE YOU GOT MONEY TO BUY MORE.: NEVER TRUE

## 2024-05-14 SDOH — ECONOMIC STABILITY: INCOME INSECURITY: HOW HARD IS IT FOR YOU TO PAY FOR THE VERY BASICS LIKE FOOD, HOUSING, MEDICAL CARE, AND HEATING?: NOT HARD AT ALL

## 2024-05-14 ASSESSMENT — ENCOUNTER SYMPTOMS
BLOOD IN STOOL: 0
DIARRHEA: 0
VOMITING: 0
NAUSEA: 0
CONSTIPATION: 1
ABDOMINAL PAIN: 1

## 2024-05-14 ASSESSMENT — PATIENT HEALTH QUESTIONNAIRE - PHQ9
SUM OF ALL RESPONSES TO PHQ QUESTIONS 1-9: 0
2. FEELING DOWN, DEPRESSED OR HOPELESS: NOT AT ALL
SUM OF ALL RESPONSES TO PHQ QUESTIONS 1-9: 0
SUM OF ALL RESPONSES TO PHQ9 QUESTIONS 1 & 2: 0
SUM OF ALL RESPONSES TO PHQ QUESTIONS 1-9: 0
1. LITTLE INTEREST OR PLEASURE IN DOING THINGS: NOT AT ALL
SUM OF ALL RESPONSES TO PHQ QUESTIONS 1-9: 0

## 2024-05-14 NOTE — PATIENT INSTRUCTIONS
Call and schedule an appt for the ultrasound and follow up after the results.   If the ultrasound is unrevealing, then a HIDA scan which is a test of the gallbladder may be ordered wherein you will follow up after completion of that test to review the results.

## 2024-05-14 NOTE — PROGRESS NOTES
Rm:01    Chief Complaint   Patient presents with    Abdominal Pain   First time was 3 weeks ago -   Right under the breast - feels like labor pain that wont stop   Was given Dicyclomine   Started again on Sunday   Thinks she may be lactose since it happened twice after having dairy   \"Have you been to the ER, urgent care clinic since your last visit?  Hospitalized since your last visit?\"    Yes - [facility]      “Have you seen or consulted any other health care providers outside of [facility] since your last visit?”    NO            Click Here for Release of Records Request   
signature was used to authenticate this note.    --Kamlesh Cortes MD

## 2024-05-15 LAB
ALBUMIN SERPL-MCNC: 3.1 G/DL (ref 3.5–5)
ALBUMIN/GLOB SERPL: 0.8 (ref 1.1–2.2)
ALP SERPL-CCNC: 126 U/L (ref 45–117)
ALT SERPL-CCNC: 42 U/L (ref 12–78)
ANION GAP SERPL CALC-SCNC: 5 MMOL/L (ref 5–15)
AST SERPL-CCNC: 34 U/L (ref 15–37)
BASOPHILS # BLD: 0.1 K/UL (ref 0–0.1)
BASOPHILS NFR BLD: 1 % (ref 0–1)
BILIRUB SERPL-MCNC: 0.4 MG/DL (ref 0.2–1)
BUN SERPL-MCNC: 25 MG/DL (ref 6–20)
BUN/CREAT SERPL: 23 (ref 12–20)
CALCIUM SERPL-MCNC: 10 MG/DL (ref 8.5–10.1)
CHLORIDE SERPL-SCNC: 106 MMOL/L (ref 97–108)
CO2 SERPL-SCNC: 27 MMOL/L (ref 21–32)
COMMENT:: NORMAL
CREAT SERPL-MCNC: 1.11 MG/DL (ref 0.55–1.02)
DIFFERENTIAL METHOD BLD: ABNORMAL
EOSINOPHIL # BLD: 0.2 K/UL (ref 0–0.4)
EOSINOPHIL NFR BLD: 3 % (ref 0–7)
ERYTHROCYTE [DISTWIDTH] IN BLOOD BY AUTOMATED COUNT: 13.7 % (ref 11.5–14.5)
GLOBULIN SER CALC-MCNC: 3.9 G/DL (ref 2–4)
GLUCOSE SERPL-MCNC: 97 MG/DL (ref 65–100)
HCT VFR BLD AUTO: 37.3 % (ref 35–47)
HGB BLD-MCNC: 11.3 G/DL (ref 11.5–16)
IMM GRANULOCYTES # BLD AUTO: 0 K/UL (ref 0–0.04)
IMM GRANULOCYTES NFR BLD AUTO: 1 % (ref 0–0.5)
LYMPHOCYTES # BLD: 1.2 K/UL (ref 0.8–3.5)
LYMPHOCYTES NFR BLD: 19 % (ref 12–49)
MCH RBC QN AUTO: 29.7 PG (ref 26–34)
MCHC RBC AUTO-ENTMCNC: 30.3 G/DL (ref 30–36.5)
MCV RBC AUTO: 98.2 FL (ref 80–99)
MONOCYTES # BLD: 0.4 K/UL (ref 0–1)
MONOCYTES NFR BLD: 6 % (ref 5–13)
NEUTS SEG # BLD: 4.6 K/UL (ref 1.8–8)
NEUTS SEG NFR BLD: 70 % (ref 32–75)
NRBC # BLD: 0 K/UL (ref 0–0.01)
NRBC BLD-RTO: 0 PER 100 WBC
PLATELET # BLD AUTO: 312 K/UL (ref 150–400)
POTASSIUM SERPL-SCNC: 4.6 MMOL/L (ref 3.5–5.1)
PROT SERPL-MCNC: 7 G/DL (ref 6.4–8.2)
RBC # BLD AUTO: 3.8 M/UL (ref 3.8–5.2)
SODIUM SERPL-SCNC: 138 MMOL/L (ref 136–145)
SPECIMEN HOLD: NORMAL
WBC # BLD AUTO: 6.5 K/UL (ref 3.6–11)

## 2024-05-16 LAB
BACTERIA SPEC CULT: NORMAL
CC UR VC: NORMAL
SERVICE CMNT-IMP: NORMAL

## 2024-05-17 ENCOUNTER — HOSPITAL ENCOUNTER (OUTPATIENT)
Facility: HOSPITAL | Age: 74
End: 2024-05-17
Attending: INTERNAL MEDICINE
Payer: MEDICARE

## 2024-05-17 VITALS — BODY MASS INDEX: 32.15 KG/M2 | WEIGHT: 193 LBS | HEIGHT: 65 IN

## 2024-05-17 DIAGNOSIS — Z12.31 SCREENING MAMMOGRAM FOR HIGH-RISK PATIENT: ICD-10-CM

## 2024-05-17 PROCEDURE — 77063 BREAST TOMOSYNTHESIS BI: CPT

## 2024-05-20 ENCOUNTER — TELEPHONE (OUTPATIENT)
Age: 74
End: 2024-05-20

## 2024-05-20 NOTE — TELEPHONE ENCOUNTER
I contacted the patient to go over her lab results per 's request but I have to leave a VM. On the VM I stated that the patient could either return the phone call or check her MyChart to see the comments from . A copy of the results as well as 's recommendations have been sent to the patient via mail as well.

## 2024-05-20 NOTE — TELEPHONE ENCOUNTER
----- Message from Lyn Cortes MD sent at 5/17/2024  6:11 PM EDT -----  Call:  all of your labs are within acceptable limits.  The hemoglobin is slightly low, but appears close to your baseline and is near normal at 11.3 with normal being 11.5 or greater.  Also your kidney function is stable without any acute findings. One of the liver function tests are elevated for which the ultrasound will further evaluate.  The results will determine if any additional labs are needed.  Have a great weekend. Keep your routinely scheduled appts.  Continue with the recommendations as discussed at your appt.    How Severe Is Your Scar?: mild Is This A New Presentation, Or A Follow-Up?: Scar

## 2024-05-29 ENCOUNTER — HOSPITAL ENCOUNTER (OUTPATIENT)
Age: 74
Discharge: HOME | End: 2024-06-01
Payer: MEDICARE

## 2024-05-29 DIAGNOSIS — R10.10 PAIN OF UPPER ABDOMEN: ICD-10-CM

## 2024-05-29 PROCEDURE — 76705 ECHO EXAM OF ABDOMEN: CPT

## 2024-05-31 ENCOUNTER — TELEPHONE (OUTPATIENT)
Age: 74
End: 2024-05-31

## 2024-05-31 NOTE — TELEPHONE ENCOUNTER
Left VM to call the office so I can let her know that her ultrasound says in process and will be notified once it is resulted.

## 2024-05-31 NOTE — TELEPHONE ENCOUNTER
Please call pt and advise that her ultrasound says in process and will be notified once it is resulted. Thanks.

## 2024-05-31 NOTE — TELEPHONE ENCOUNTER
Pt called asking about the update on her ultra sound imaging on her abdomenshe wanted to know if you got the results back please call pt she is not on my chart

## 2024-06-03 ENCOUNTER — TELEPHONE (OUTPATIENT)
Age: 74
End: 2024-06-03

## 2024-06-03 NOTE — TELEPHONE ENCOUNTER
----- Message from Chaya Lawson sent at 6/3/2024  9:20 AM EDT -----  Subject: Message to Provider    QUESTIONS  Information for Provider? Patient is requesting a call from Dr. Cortes   regarding her ultrasound test results. Please call patient back on this   number [phone]  ---------------------------------------------------------------------------  --------------  CALL BACK INFO  [phone]; OK to leave message on voicemail,OK to respond with   electronic message via [employer] portal (only for patients who have   registered [employer] account)  ---------------------------------------------------------------------------  --------------  SCRIPT ANSWERS  Relationship to Patient? Self

## 2024-06-04 NOTE — TELEPHONE ENCOUNTER
Spoke with patient verified with 2 identifiers including name and date of birth regarding her results.  Personally reviewed with patient her CBC, CMP results.  Also personally reviewed with patient her ultrasound results which were notable for gallbladder sludge with small stones and gallbladder wall thickening at which time it was unclear if it was acute or chronic.  Discussed with patient that it possibly could be chronic as since her white blood cell count was normal however patient did state that she continues to have symptoms including chronic nausea and chills in addition to \"feeling pregnant\".  Advised the patient to go to the ER to have her CBC and CMP repeated in addition to a surgical consult to review the images and determine if cholecystectomy would be indicated.  Advised the patient to be n.p.o. and no further questions.

## 2024-07-22 DIAGNOSIS — E55.9 HYPOVITAMINOSIS D: ICD-10-CM

## 2024-07-22 NOTE — TELEPHONE ENCOUNTER
Last appointment: 05/14/2024 MD Cortes   Next appointment: Nothing scheduled   Previous refill encounter(s):   07/13/2023 Vitamin D2 #15 with 1 refill.     For Pharmacy Admin Tracking Only    Program: Medication Refill  Intervention Detail: New Rx: 1, reason: Patient Preference  Time Spent (min): 5    Requested Prescriptions     Pending Prescriptions Disp Refills    vitamin D (ERGOCALCIFEROL) 1.25 MG (93331 UT) CAPS capsule [Pharmacy Med Name: VITAMIN D2 1.25 MG (30099 UNIT)] 4 capsule 0     Sig: TAKE ONE CAPSULE BY MOUTH EVERY 7 DAYS

## 2024-07-24 RX ORDER — ERGOCALCIFEROL 1.25 MG/1
CAPSULE ORAL
Qty: 4 CAPSULE | Refills: 0 | Status: SHIPPED | OUTPATIENT
Start: 2024-07-24

## 2024-07-24 NOTE — TELEPHONE ENCOUNTER
Refill request(s) approved--ergocalciferol 50,000 units weekly.    Refill protocol details (computer-generated) reviewed, as available.    Lab Results   Component Value Date/Time    VITD25 90.6 05/17/2023 10:39 AM        Needs follow-up appt and labs.  [employer] message to pt--to schedule follow-up appt.    No future appointments.    Requested Prescriptions     Signed Prescriptions Disp Refills    vitamin D (ERGOCALCIFEROL) 1.25 MG (18084 UT) CAPS capsule 4 capsule 0     Sig: TAKE ONE CAPSULE BY MOUTH EVERY 7 DAYS     Authorizing Provider: ELIO WELCH

## 2024-08-27 DIAGNOSIS — E55.9 HYPOVITAMINOSIS D: ICD-10-CM

## 2024-08-27 NOTE — TELEPHONE ENCOUNTER
Please contact patient for scheduling. Thanks  Dr. SHMUEL Lara sent pt a [employer] message on 7/24/24.     Last appointment: 05/14/2024 MD Cortes   Next appointment: Nothing scheduled   Previous refill encounter(s):   07/24/2024 Vitamin D2 #4     For Pharmacy Admin Tracking Only    Program: Medication Refill  Intervention Detail: New Rx: 1, reason: Patient Preference  Time Spent (min): 5  Requested Prescriptions     Pending Prescriptions Disp Refills    vitamin D (ERGOCALCIFEROL) 1.25 MG (82010 UT) CAPS capsule [Pharmacy Med Name: VITAMIN D2 1.25 MG (69836 UNIT)] 4 capsule 0     Sig: TAKE ONE CAPSULE BY MOUTH EVERY 7 DAYS

## 2024-08-31 RX ORDER — ERGOCALCIFEROL 1.25 MG/1
50000 CAPSULE, LIQUID FILLED ORAL WEEKLY
Qty: 4 CAPSULE | Refills: 0 | Status: SHIPPED | OUTPATIENT
Start: 2024-08-31

## 2024-08-31 NOTE — TELEPHONE ENCOUNTER
Refill request(s) approved--ergocalciferol 50,000 units weekly--needs to update labs for further refills.  Needs office appt and fasting labs.    Refill protocol details (computer-generated) reviewed, as available.      Lab Results   Component Value Date/Time    VITD25 90.6 05/17/2023 10:39 AM      Requested Prescriptions     Signed Prescriptions Disp Refills    vitamin D (ERGOCALCIFEROL) 1.25 MG (33224 UT) CAPS capsule 4 capsule 0     Sig: Take 1 capsule by mouth once a week Needs office appt and fasting labs for further refills.     Authorizing Provider: ELIO WELCH

## 2024-08-31 NOTE — TELEPHONE ENCOUNTER
Please schedule a follow-up appointment for medication follow-up, blood pressure follow-up, fasting labs.

## 2024-09-09 DIAGNOSIS — E78.00 ELEVATED CHOLESTEROL: Primary | ICD-10-CM

## 2024-09-09 NOTE — TELEPHONE ENCOUNTER
Please contact patient for scheduling. Thanks  Dr. SHMUEL Lara sent pt a [employer] message.     Last appointment: 05/14/2024 MD Cortes & 07/13/2023 MD Children   Next appointment: Nothing scheduled   Previous refill encounter(s):   03/05/2024 Lipitor #90 with 1 refill.     For Pharmacy Admin Tracking Only    Program: Medication Refill  Intervention Detail: New Rx: 1, reason: Patient Preference  Time Spent (min): 5  Requested Prescriptions     Pending Prescriptions Disp Refills    atorvastatin (LIPITOR) 10 MG tablet [Pharmacy Med Name: ATORVASTATIN 10 MG TAB] 90 tablet 0     Sig: TAKE ONE TABLET BY MOUTH ONE TIME DAILY

## 2024-09-15 RX ORDER — ATORVASTATIN CALCIUM 10 MG/1
10 TABLET, FILM COATED ORAL DAILY
Qty: 30 TABLET | Refills: 0 | Status: SHIPPED | OUTPATIENT
Start: 2024-09-15

## 2024-09-15 NOTE — TELEPHONE ENCOUNTER
Refill request(s) approved--atorvastatin--30-day supply with 0 refill(s).    Refill protocol details (computer-generated) reviewed, as available.      Please schedule a follow-up appointment for medication follow-up, blood pressure follow-up, fasting labs.

## 2024-10-01 DIAGNOSIS — E78.00 ELEVATED CHOLESTEROL: ICD-10-CM

## 2024-10-02 RX ORDER — ATORVASTATIN CALCIUM 10 MG/1
10 TABLET, FILM COATED ORAL DAILY
Qty: 30 TABLET | Refills: 0 | OUTPATIENT
Start: 2024-10-02

## 2024-10-02 NOTE — TELEPHONE ENCOUNTER
Duplicate request: Too soon   Per Dr. Lara pt needs labs and OV.   09/15/2024 Lipitor 10 mg #30 was sent to [facility] #[id number].     For Pharmacy Admin Tracking Only    Program: Medication Refill  Intervention Detail: Discontinued Rx: 1, reason: Duplicate Therapy  Time Spent (min): 5  Requested Prescriptions     Pending Prescriptions Disp Refills    atorvastatin (LIPITOR) 10 MG tablet [Pharmacy Med Name: ATORVASTATIN 10 MG TAB[*]] 30 tablet 0     Sig: TAKE ONE TABLET BY MOUTH ONE TIME DAILY

## 2024-10-31 DIAGNOSIS — E78.00 ELEVATED CHOLESTEROL: ICD-10-CM

## 2024-11-01 NOTE — TELEPHONE ENCOUNTER
Please contact patient for scheduling and labs. Thanks    Last appointment: 05/14/2024 MD Cortes   Next appointment: Nothing scheduled   Previous refill encounter(s):   09/15/2024 Lipitor #30    For Pharmacy Admin Tracking Only    Program: Medication Refill  Intervention Detail: New Rx: 1, reason: Patient Preference  Time Spent (min): 5    Requested Prescriptions     Pending Prescriptions Disp Refills    atorvastatin (LIPITOR) 10 MG tablet [Pharmacy Med Name: ATORVASTATIN 10 MG TAB[*]] 30 tablet 0     Sig: Take 1 tablet by mouth daily Pt needs appt and fasting labs for future refills.

## 2024-11-03 RX ORDER — ATORVASTATIN CALCIUM 10 MG/1
TABLET, FILM COATED ORAL
Qty: 30 TABLET | Refills: 0 | OUTPATIENT
Start: 2024-11-03

## 2024-11-03 NOTE — TELEPHONE ENCOUNTER
Atorvastatin refill not approved.    She needs follow-up appt and fasting labs.    She has not been contacted to schedule follow-up appt with prior requests, including [employer] message to pt.

## 2024-11-05 DIAGNOSIS — E78.00 ELEVATED CHOLESTEROL: ICD-10-CM

## 2024-11-09 DIAGNOSIS — E78.00 ELEVATED CHOLESTEROL: ICD-10-CM

## 2024-11-11 NOTE — TELEPHONE ENCOUNTER
Duplicate request:       Requested Prescriptions     Pending Prescriptions Disp Refills    atorvastatin (LIPITOR) 10 MG tablet [Pharmacy Med Name: ATORVASTATIN 10 MG TAB] 90 tablet 1     Sig: TAKE ONE TABLET BY MOUTH ONE TIME DAILY

## 2024-11-11 NOTE — TELEPHONE ENCOUNTER
Please contact patient for scheduling and fasting labs.  Thanks  Dr. SHMUEL Lara sent pt a message via [employer] on 07/24/24.     Last appointment: 05/14/2024 MD Cortes   Next appointment: Nothing scheduled   Previous refill encounter(s):   09/15/2024 Lipitor #30     For Pharmacy Admin Tracking Only    Program: Medication Refill  Intervention Detail: New Rx: 1, reason: Patient Preference  Time Spent (min): 5    Requested Prescriptions     Pending Prescriptions Disp Refills    atorvastatin (LIPITOR) 10 MG tablet [Pharmacy Med Name: ATORVASTATIN 10 MG TAB[*]] 30 tablet 0     Sig: Take 1 tablet by mouth daily Pt needs appt and fasting labs for future refills.

## 2024-11-14 RX ORDER — ATORVASTATIN CALCIUM 10 MG/1
TABLET, FILM COATED ORAL
Qty: 30 TABLET | Refills: 0 | OUTPATIENT
Start: 2024-11-14

## 2024-11-14 RX ORDER — ATORVASTATIN CALCIUM 10 MG/1
10 TABLET, FILM COATED ORAL DAILY
Qty: 30 TABLET | Refills: 0 | Status: SHIPPED | OUTPATIENT
Start: 2024-11-14

## 2024-11-14 RX ORDER — ATORVASTATIN CALCIUM 10 MG/1
10 TABLET, FILM COATED ORAL DAILY
Qty: 90 TABLET | Refills: 1 | OUTPATIENT
Start: 2024-11-14

## 2024-11-14 NOTE — TELEPHONE ENCOUNTER
Please contact patient to schedule a follow-up appt.      Refill request(s) approved--atorvastatin--30-day supply with 0 refill(s)--as prior.    Refill protocol details (computer-generated) reviewed, as available.      Last seen by me July 2023.  She had visit with Dr. Cortes with labs May 2024, but no lipids done then.      No future appointments.    Requested Prescriptions     Signed Prescriptions Disp Refills    atorvastatin (LIPITOR) 10 MG tablet 30 tablet 0     Sig: Take 1 tablet by mouth daily Pt needs appt and fasting labs for future refills.     Authorizing Provider: ELIO WELCH

## 2025-05-08 DIAGNOSIS — E78.00 ELEVATED CHOLESTEROL: ICD-10-CM

## 2025-05-08 RX ORDER — ATORVASTATIN CALCIUM 10 MG/1
TABLET, FILM COATED ORAL
Qty: 30 TABLET | Refills: 0 | OUTPATIENT
Start: 2025-05-08

## 2025-05-08 NOTE — TELEPHONE ENCOUNTER
Pt is no longer a pt of Dr. SHMUEL Lara. Pt has a new PCP Dr. SANTINO Torres.     For Pharmacy Admin Tracking Only    Program: Medication Refill  Intervention Detail: Discontinued Rx: 1, reason: Duplicate Therapy  Time Spent (min): 5    Requested Prescriptions     Pending Prescriptions Disp Refills    atorvastatin (LIPITOR) 10 MG tablet [Pharmacy Med Name: ATORVASTATIN 10 MG TAB[*]] 30 tablet 0     Sig: TAKE ONE TABLET BY MOUTH ONE TIME DAILY. NEEDS APPOINTMENT AND FASTING LABS FOR FUTURE REFILLS

## 2025-05-19 ENCOUNTER — HOSPITAL ENCOUNTER (OUTPATIENT)
Facility: HOSPITAL | Age: 75
Discharge: HOME | End: 2025-05-22
Payer: MEDICARE

## 2025-05-19 DIAGNOSIS — Z12.31 VISIT FOR SCREENING MAMMOGRAM: ICD-10-CM

## 2025-05-19 PROCEDURE — 77063 BREAST TOMOSYNTHESIS BI: CPT

## 2025-06-11 DIAGNOSIS — E78.00 ELEVATED CHOLESTEROL: ICD-10-CM

## 2025-08-28 RX ORDER — ATORVASTATIN CALCIUM 10 MG/1
TABLET, FILM COATED ORAL
Qty: 30 TABLET | Refills: 0 | OUTPATIENT
Start: 2025-08-28

## (undated) DEVICE — BIPOLAR IRRIGATOR INTEGRATED TUBING AND BIPOLAR CORD SET, DISPOSABLE

## (undated) DEVICE — COVER,TABLE,HEAVY DUTY,60"X90",STRL: Brand: MEDLINE

## (undated) DEVICE — CLOSURE SKIN FLX NONINVASIVE PRELOC TECHNOLOGY FOR 24IN

## (undated) DEVICE — STERILE-Z SURGICAL PATIENT COVERS CLEAR POLYETHYLENE STERILE UNIVERSAL FIT 10 PER CASE: Brand: STERILE-Z

## (undated) DEVICE — LAMINECTOMY RICHMOND-LF: Brand: MEDLINE INDUSTRIES, INC.

## (undated) DEVICE — STERILE POLYISOPRENE POWDER-FREE SURGICAL GLOVES WITH EMOLLIENT COATING: Brand: PROTEXIS

## (undated) DEVICE — 4-PORT MANIFOLD: Brand: NEPTUNE 2

## (undated) DEVICE — SPONGE GZ W4XL4IN COT 12 PLY TYP VII WVN C FLD DSGN

## (undated) DEVICE — GARMENT,MEDLINE,DVT,INT,CALF,MED, GEN2: Brand: MEDLINE

## (undated) DEVICE — KIT EVAC 0.13IN RECT TB DIA10FR 400CC PVC 3 SPR Y CONN DRN

## (undated) DEVICE — SOLUTION IRRIG 3000ML 0.9% SOD CHL FLX CONT 0797208] ICU MEDICAL INC]

## (undated) DEVICE — SUTURE VCRL 2-0 L27IN ABSRB UD CP-2 L26MM 1/2 CIR REV CUT J869H

## (undated) DEVICE — SOLUTION SURG PREP 26 CC PURPREP

## (undated) DEVICE — SOLUTION IV 250ML 0.9% SOD CHL CLR INJ FLX BG CONT PRT CLSR

## (undated) DEVICE — SPHERE STEALTH 12PK/TY --

## (undated) DEVICE — TOTAL TRAY, 16FR 10ML SIL FOLEY, URN: Brand: MEDLINE

## (undated) DEVICE — Device

## (undated) DEVICE — HANDPIECE SET WITH BONE CLEANING TIP AND SUCTION TUBE: Brand: INTERPULSE

## (undated) DEVICE — BONE WAX WHITE: Brand: BONE WAX WHITE

## (undated) DEVICE — PREP KIT PEEL PTCH POVIDONE IOD

## (undated) DEVICE — SUTURE ABSRB BRAID COAT UD OS-6 NO 1 27IN VCRL J535H

## (undated) DEVICE — PAD,ABDOMINAL,5"X9",ST,LF,25/BX: Brand: MEDLINE INDUSTRIES, INC.

## (undated) DEVICE — POSITIONER HD REST FOAM CMFRT TCH

## (undated) DEVICE — FLOSEAL HEMOSTATIC MATRIX, 10ML: Brand: FLOSEAL HEMOSTATIC MATRIX

## (undated) DEVICE — INFECTION CONTROL KIT SYS

## (undated) DEVICE — TOOL 14MH30 LEGEND 14CM 3MM: Brand: MIDAS REX ™